# Patient Record
Sex: MALE | Race: WHITE
[De-identification: names, ages, dates, MRNs, and addresses within clinical notes are randomized per-mention and may not be internally consistent; named-entity substitution may affect disease eponyms.]

---

## 2017-09-07 NOTE — XCELERA REPORT
[address] [address]

                             Tel: [phone]

                             Fax: [phone]



                    Lower Extremity Arterial Evaluation

____________________________________________________________________________



Name: MELI BALDWIN

MRN: [id number]                Age: 67 yrs

Gender: Male                   : 1950

Patient Status: Outpatient     Patient Location: 

Account #: [id number]

Study Date: 2017 08:01 AM

Accession #: [id number]

____________________________________________________________________________



Procedure: A color flow and duplex scan of the lower extremity arteries was

performed bilaterally with velocity and waveform anaylsis. Ankle brachial

indicies performed.

Reason For Study: PAIN





Ordering Physician: WILLIAMS, LENNOX

Performed By: Jw Barrett

____________________________________________________________________________



____________________________________________________________________________





Measurements and Calculations



                                   Right         Left

  CFA PSV                          107.6        130.4    cm/sec

  Prox PFA PSV                      65.3        -104.1   cm/sec

  Dist SFA PSV                     -95.5        -111.7   cm/sec

  Dist Pop A PSV                    98.7         95.7    cm/sec

  Dist KAJAL PSV                     116.3        118.6    cm/sec

  Dist PTA PSV                     135.1        188.6    cm/sec

  Iain Pedis PSV                     96.6        -123.4   cm/sec



____________________________________________________________________________



Right Side Arterial Evaluation

Normal velocity and triphasic waveforms noted from the Common Femoral

artery to the infrageniculate vessels.



0 % stenosis noted.



Ankle Brachial index not obtainable due to compressibility.



Left Side Arterial Evaluation

Normal velocity and triphasic waveforms noted from the Common Femoral

artery to the infrageniculate vessels.



0 % stenosis noted.



Ankle Brachial index not obtainable due to compressibility.

____________________________________________________________________________



Interpretation Summary

No hemodynamically significant lesions in the bilateral lower extremities,

on duplex imaging, at rest. Atherosclerotic change suggested by non

compressibility.

____________________________________________________________________________



Electronically signed by:      Lennox Williams      on 2017 12:25 PM



CC: WILLIAMS, LENNOX

>

Williams, Lennox

## 2017-09-27 NOTE — RADIOLOGY REPORT (SQ)
EXAM DESCRIPTION:  FOOT LEFT COMPLETE



COMPLETED DATE/TIME:  9/27/2017 11:04 am



REASON FOR STUDY:  NON-PRS CHRONIC ULCER OTH PRT LEFT FOOT W FAT LAYER EXPOSED L97.522  NON-PRS CHRON
IC ULCER OTH PRT LEFT FOOT W FAT LAYER



COMPARISON:  None.



NUMBER OF VIEWS:  Three views.



TECHNIQUE:  AP, lateral and oblique  radiographic images acquired of the left foot.



LIMITATIONS:  None.



FINDINGS:  MINERALIZATION: Normal.

BONES: No acute fracture or dislocation.  No worrisome bone lesions.  No evidence of osteomyelitis.

JOINTS: There is mild subluxation of the 1st interphalangeal joint.

SOFT TISSUES: The soft tissue ulcer on the base of the 1st digit.  There is no evidence of osteomyeli
tis.  Extensive calcification within Achilles tendon.

OTHER: No other significant finding.



IMPRESSION:  Mild subluxation of the 1st interphalangeal joint.  No evidence of osteomyelitis.  Prior
 Achilles tendinopathy versus prior injury.



TECHNICAL DOCUMENTATION:  JOB ID:  [id number]

 2011 [employer]- All Rights Reserved

## 2018-11-29 NOTE — RADIOLOGY REPORT (SQ)
EXAM DESCRIPTION:  FOOT BILATERAL 3 VIEWS



COMPLETED DATE/TIME:  11/29/2018 11:31 am



REASON FOR STUDY:  NON PRESSURE ULCER RT/LT FOOT WITH FAT LAYER EXPOSED E11.621  TYPE 2 DIABETES PITO
ITUS WITH FOOT ULCER L97.522  NON-PRS CHRONIC ULCER OTH PRT LEFT FOOT W FAT LAYER  L97.512  NON-PRS C
HRONIC ULCER OTH PRT RIGHT FOOT W FAT LAYER



COMPARISON:  None.



NUMBER OF VIEWS:  Three views.



TECHNIQUE:  AP, lateral and oblique  radiographic images acquired of the right and left foot.



LIMITATIONS:  None.



FINDINGS:  MINERALIZATION: Normal.

BONES: No fracture.  Partial amputation of the tip of the left 1st distal phalanx.  Subluxation of th
e left 1st interphalangeal joint.  The proximal and distal phalanges in the left 1st digit have a errol
ewhat heterogeneous appearance.  There is a prominent dorsal calcaneal spur on the left.  No signific
ant osseous abnormality is seen on the right.  There are dorsal and plantar calcaneal spurs on the ri
ght.

JOINTS: No effusions.

SOFT TISSUES: Soft tissue calcifications are seen in the Achilles tendon on the left.

OTHER: No other significant finding.



IMPRESSION:  1.  There are changes in the left 1st digit as described.  Cannot exclude osteomyelitis.


2.  There is a prominent dorsal calcaneal spur on the left.  There is evidence of Achilles tendinopat
hy.

3.  Small dorsal and plantar calcaneal spurs on the right.



TECHNICAL DOCUMENTATION:  JOB ID:  [id number]

 2011 [employer]- All Rights Reserved



Reading location - IP/workstation name: [email or web address]

## 2018-12-06 NOTE — EKG REPORT
SEVERITY:- OTHERWISE NORMAL ECG -

SINUS RHYTHM

BORDERLINE LEFT AXIS DEVIATION

:

Confirmed by: Nic Lugo 06-Dec-2018 22:35:52

## 2018-12-06 NOTE — ER DOCUMENT REPORT
ED General





- General


Chief Complaint: Foot Pain


Stated Complaint: FOOT PAIN


Time Seen by Provider: 12/06/18 12:03


Mode of Arrival: Ambulatory


Information source: Patient


TRAVEL OUTSIDE OF THE [place] IN LAST 30 DAYS: No





- HPI


Notes: 





60-year-old male with a history of type 2 diabetes, hypertension presents to 

the ED for left toe pain with diabetic ulcers and weeping discharge from his 

wound care clinic.  Patient has been seeing wound clinic for diabetic ulcers, 

was told that he may have osteomyelitis and will need IV antibiotics.  Has not 

tried over-the-counter medications.  Patient states his A1c is roughly 9.6, he 

does have an appointment with his primary care provider in the near future.  

Denies any new injury.  States he has not had any sensation in his left foot 

for "years" denies fevers, chills,  chest pain,palpitations,  shortness of 

breath, d speech changes, LH, dizziness, syncope, headaches, wheezing, ST, URI, 

neck pain, numbness or tingling in bilateral upper or lower extremities equally 

aside from his left foot, muscle paralysis, weakness in bilateral upper or 

lower extremities bilaterally. 





- Related Data


Allergies/Adverse Reactions: 


 





No Known Allergies Allergy (Unverified 12/06/18 11:36)


 











Past Medical History





- General


Information source: Patient





- Social History


Smoking Status: Never Smoker


Frequency of alcohol use: None


Drug Abuse: None


Family History: Reviewed & Not Pertinent


Patient has suicidal ideation: No


Patient has homicidal ideation: No





- Past Medical History


Cardiac Medical History: Reports: Hx Hypercholesterolemia, Hx Hypertension


Endocrine Medical History: Reports: Hx Diabetes Mellitus Type 2


Renal/ Medical History: Denies: Hx Peritoneal Dialysis





Review of Systems





- Review of Systems


Constitutional: No symptoms reported


EENT: No symptoms reported


Cardiovascular: No symptoms reported


Respiratory: No symptoms reported


Gastrointestinal: No symptoms reported


Genitourinary: No symptoms reported


Male Genitourinary: No symptoms reported


Musculoskeletal: No symptoms reported


Skin: See HPI


Hematologic/Lymphatic: No symptoms reported


Neurological/Psychological: No symptoms reported





Physical Exam





- Vital signs


Vitals: 


 











Temp Pulse Resp BP Pulse Ox


 


 98.9 F   76   16   128/63 H  97 


 


 12/06/18 11:37  12/06/18 11:37  12/06/18 11:37  12/06/18 11:37  12/06/18 11:37














- Notes


Notes: 





PHYSICAL EXAMINATION:





GENERAL: Well-appearing, well-nourished and in no acute distress.





HEAD: Atraumatic, normocephalic.





EYES: Pupils equal round and reactive to light, extraocular movements intact, 

sclera anicteric, conjunctiva are normal.





ENT: Nares patent, oropharynx clear without exudates.  Moist mucous membranes.





NECK: Normal range of motion, supple without lymphadenopathy





LUNGS: Breath sounds clear to auscultation bilaterally and equal.  No wheezes 

rales or rhonchi.





HEART: Regular rate and rhythm without murmurs





ABDOMEN: Soft, nontender, nondistended abdomen.  No guarding, no rebound.  No 

masses appreciated.





Musculoskeletal: Normal range of motion, no pitting or edema.  No cyanosis.





NEUROLOGICAL: Cranial nerves grossly intact.  Normal speech, normal gait.  

Normal sensory, motor exams 





PSYCH: Normal mood, normal affect.





SKIN: Warm, Dry, normal turgor, no rashes or lesions noted.  Decubitus diabetic 

ulcers to left great toe with purulent drainage noted with surrounding 

erythema.  Distal pulses +2 bilateral lower extremities.  Skin warm, cap refill 

less than 3 seconds.  Ulcer to right great toe approximately 2 cm x 2 cm.





Course





- Re-evaluation


Re-evalutation: 





12/06/18 14:13


-year-old male presents to the ED for evaluation of left great toe decubitus 

ulcer for concerns of osteomyelitis per wound clinic.  CBC shows a white count 

of 11.4 no shift, lactic 1.5, CRP 72.3 x-ray of left foot does show extensive 

soft tissue swelling with subcutaneous emphysema concerning for gas formation 

as well as concerning for osteomyelitis due to new erosive loss of 

mineralization of the distal phalanx, patient has had previous dislocation. 

consulted with Dr. Navi Renteria, surgeon, at 1408  Consulted with Dr. Mary Shearer, hospitalist at 1410 for IV antibiotics and management of colitis 

possible necrotizing fasciitis, surgery consulted and will see patient.  Plan 

of care and agreed with plan of care.  Patient admitted to medical floor under 

hospitalist service.  Vitals remained Stable throughout duration in ER





- Vital Signs


Vital signs: 


 











Temp Pulse Resp BP Pulse Ox


 


 98.9 F   76   16   128/63 H  97 


 


 12/06/18 11:37  12/06/18 11:37  12/06/18 11:37  12/06/18 11:37  12/06/18 11:37














- Laboratory


Result Diagrams: 


 12/06/18 12:38





 12/06/18 12:38


Laboratory results interpreted by me: 


 











  12/06/18 12/06/18





  12:38 12:38


 


WBC  11.4 H 


 


RBC  3.57 L 


 


Hgb  10.5 L 


 


Hct  31.3 L 


 


Sodium   136.3 L


 


Chloride   93 L


 


BUN   35 H


 


Creatinine   1.59 H


 


Est GFR ( Amer)   53 L


 


Est GFR (Non-Af Amer)   44 L


 


Glucose   327 H


 


ALT   19 L


 


C-Reactive Protein   72.3 H














Discharge





- Discharge


Clinical Impression: 


 Toe osteomyelitis, left





Condition: Stable


Disposition: ADMITTED AS INPATIENT


Admitting Provider: Hospitalist - Dr. Mendoza [facility]


Unit Admitted: Medical Floor

## 2018-12-06 NOTE — PDOC CONSULTATION
History of Present Illness


Admission Date/PCP: 


  12/06/18 14:31





  AUSTIN PETERSON MD





Patient complains of: Left great toe infection


History of Present Illness: 


MELI BALDWIN is a 68 year old male with diabetes with several month history 

of left great toe ulcer now presenting with worsening redness and generalized 

malaise over the past several days.  Along with drainage.  Patient denies any 

fever but he has not been feeling well.  Elevated blood sugars.  Foul-smelling 

drainage.  Patient does have sensation in his left foot albeit somewhat 

diminished.  Patient denies any history of myocardial infarction nor 

cerebrovascular accident.  No known peripheral vascular disease.








Past Medical History


Cardiac Medical History: Reports: Hyperlipidema, Hypertension


Endocrine Medical History: Reports: Diabetes Mellitus Type 2





Social History


Smoking Status: Never Smoker





Family History


Parental Family History Reviewed: No


Children Family History Reviewed: No


Sibling(s) Family History Reviewed.: No





Medication/Allergy


Allergies/Adverse Reactions: 


 





No Known Allergies Allergy (Unverified 12/06/18 11:36)


 











Physical Exam


Vital Signs: 


 











Temp Pulse Resp BP Pulse Ox


 


 98.9 F   76   16   128/63 H  97 


 


 12/06/18 11:37  12/06/18 11:37  12/06/18 11:37  12/06/18 11:37  12/06/18 11:37











General appearance: PRESENT: no acute distress, cooperative


Eye exam: PRESENT: conjunctiva pink


Respiratory exam: PRESENT: clear to auscultation roshan


Cardiovascular exam: PRESENT: RRR


GI/Abdominal exam: PRESENT: other - Soft, nondistended, nontender to palpation.


Extremities exam: PRESENT: other - On the right great toe patient has a very 

shallow clean ulcer with no drainage no erythema.  Patient has some skin 

thickening around the great toe however.  He has palpable dorsalis pedis and 

posterior tibial pulses.  On the left side patient has diffuse swelling and 

erythema and crepitus of the right great toe extending cephalad to the 

midportion of his great toe metatarsal.  There is foul-smelling drainage.  

There is an ulcer at the plantar surface of the great toe with drainage at the 

center.  I do not appreciate dorsalis pedis pulse but there is 2+ posterior 

tibial pulse on the left side.


Neurological exam: PRESENT: alert, awake


Psychiatric exam: PRESENT: appropriate affect


Skin exam: PRESENT: warm





Results


Impressions: 


 





Toe X-Ray  12/06/18 12:05


IMPRESSION:  1. Interval reduction of the previously seen dislocation of the 

distal phalanx of the left great toe.


2. There is new erosive loss of mineralization of the distal phalanx, 

concerning for osteomyelitis.


3.  Extensive soft tissue swelling about the left great toe with subcutaneous 

emphysema, findings concerning for gas-forming infection.


 














Assessment & Plan





- Diagnosis


(1) Toe osteomyelitis, left


Is this a current diagnosis for this admission?: Yes   


Plan: 


With subcutaneous air and erythema and foul-smelling drainage.  We will plan 

emergency left great toe amputation and debridement.  Will obviously leave the 

wound open with the degree of infection that is present.  I have discussed with 

the patient the nature of the surgery and the risk and benefits including risk 

of requiring additional surgery, poor wound healing, continued spreading of 

infection, bleeding.  Patient understands and agrees to proceed.

## 2018-12-06 NOTE — PDOC H&P
History of Present Illness


Admission Date/PCP: 


  





  AUSTIN PETERSON MD





Patient complains of: Foot pain


History of Present Illness: 


MELI BALDWIN is a 68 year old male past medical history of diabetes and 

hypertension presented to ED ED complaining of bilateral great toe pain, 

plantar aspect ulcers ulcers, weeping discharge associated with pain with 

ambulation. 





He has had left great toe plantar aspect ulcer about 4 months which has been 

managed as outpatient by his podiatrist.  One week ago he noticed that his left 

great toe ulcer was worsening and is starting to get red and become more 

painful he went to his podiatrist and was discharged with wound care.  He 

noticed that his ulcer was not getting better and today he noticed that it was 

getting redder and starting to produce purulent discharge.  Also has right 

great toe plantar aspect ulcer which has a started about a week ago he denies 

noticing form of trauma that could have caused the ulcer.  





He is denying any fever, shortness of breath, chills, nausea, vomiting, diarrhea

, constipation or any urinary symptoms.  





ED he was found to have severely elevated CRP. 


10/6/2018 left foot x-ray showed a left great toe new erosive loss of 

mineralization concerning for osteomyelitis as well as extensive soft tissue 

swelling around the great left toe with subcutaneous emphysema.  


10/6/2018 right great toe x-ray could not exclude osteomyelitis.





Surgery was consulted by ED physician and they were asked to call medicine team 

for admission and will follow patient.








Past Medical History


Cardiac Medical History: Reports: Hyperlipidema, Hypertension


Endocrine Medical History: Reports: Diabetes Mellitus Type 2





Social History


Smoking Status: Never Smoker





Family History


Parental Family History Reviewed: Yes


Children Family History Reviewed: Yes


Sibling(s) Family History Reviewed.: Yes





Medication/Allergy


Home Medications: 








Doxycycline Hyclate [Vibramycin 100 mg Tablet] 20 mg PO BID 12/06/18 


Fenofibrate Nanocrystallized [Tricor 145 mg Tablet] 145 mg PO DAILY 12/06/18 


Glimepiride [Amaryl 4 mg Tablet] 4 mg PO DAILY 12/06/18 


Meclizine HCl [Antivert 12.5 mg Tablet] 12.5 mg PO BIDP PRN 12/06/18 


Metoprolol Succinate [Toprol  mg Tablet] 100 mg PO DAILY 12/06/18 








Allergies/Adverse Reactions: 


 





No Known Allergies Allergy (Unverified 12/06/18 11:36)


 











Physical Exam


Vital Signs: 


 











Temp Pulse Resp BP Pulse Ox


 


 98.9 F   76   16   128/63 H  97 


 


 12/06/18 11:37  12/06/18 11:37  12/06/18 11:37  12/06/18 11:37  12/06/18 11:37








 Intake & Output











 12/05/18 12/06/18 12/07/18





 06:59 06:59 06:59


 


Weight   105.3 kg











General appearance: PRESENT: no acute distress, mild distress


Head exam: PRESENT: atraumatic, normocephalic


Eye exam: PRESENT: conjunctiva pink, EOMI, PERRLA.  ABSENT: scleral icterus


Ear exam: PRESENT: normal external ear exam


Neck exam: ABSENT: carotid bruit, JVD, lymphadenopathy, thyromegaly


Respiratory exam: PRESENT: clear to auscultation roshan.  ABSENT: rales, rhonchi, 

wheezes


Cardiovascular exam: PRESENT: RRR.  ABSENT: diastolic murmur, rubs, systolic 

murmur


Pulses: PRESENT: normal dorsalis pedis pul


Vascular exam: PRESENT: normal capillary refill


Rectal exam: PRESENT: deferred


Extremities exam: PRESENT: other - Left great toe swollen red erythematous with 

clear weeping discharge.  Tender to palpation.  There is an ulcer at the base 

of the left great toe  Right great toe plantar aspect 2 x 2 centimeter ulcer 

erythematous with no sign of discharge.


Musculoskeletal exam: PRESENT: full ROM, normal inspection


Neurological exam: PRESENT: alert, awake, oriented to person, oriented to place

, oriented to time, oriented to situation, CN II-XII grossly intact.  ABSENT: 

motor sensory deficit


Psychiatric exam: PRESENT: appropriate affect, normal mood.  ABSENT: homicidal 

ideation, suicidal ideation





Results


Laboratory Results: 


 





 12/06/18 12:38 





 12/06/18 12:38 





 











  12/06/18 12/06/18 12/06/18





  12:38 12:38 12:38


 


WBC  11.4 H  


 


RBC  3.57 L  


 


Hgb  10.5 L  


 


Hct  31.3 L  


 


MCV  88  


 


MCH  29.4  


 


MCHC  33.6  


 


RDW  13.0  


 


Plt Count  371  


 


Seg Neutrophils %  72.2  


 


Lymphocytes %  19.5  


 


Monocytes %  7.1  


 


Eosinophils %  0.3  


 


Basophils %  0.9  


 


Absolute Neutrophils  8.2  


 


Absolute Lymphocytes  2.2  


 


Absolute Monocytes  0.8  


 


Absolute Eosinophils  0.0  


 


Absolute Basophils  0.1  


 


Sodium   136.3 L 


 


Potassium   4.4 


 


Chloride   93 L 


 


Carbon Dioxide   27 


 


Anion Gap   16 


 


BUN   35 H 


 


Creatinine   1.59 H 


 


Est GFR ( Amer)   53 L 


 


Est GFR (Non-Af Amer)   44 L 


 


Glucose   327 H 


 


Lactic Acid    1.5


 


Calcium   9.4 


 


Total Bilirubin   0.7 


 


AST   31 


 


ALT   19 L 


 


Alkaline Phosphatase   69 


 


C-Reactive Protein   72.3 H 


 


Total Protein   7.5 


 


Albumin   3.7 











Impressions: 


 





Toe X-Ray  12/06/18 12:05


IMPRESSION:  1. Interval reduction of the previously seen dislocation of the 

distal phalanx of the left great toe.


2. There is new erosive loss of mineralization of the distal phalanx, 

concerning for osteomyelitis.


3.  Extensive soft tissue swelling about the left great toe with subcutaneous 

emphysema, findings concerning for gas-forming infection.


 














Assessment & Plan





- Diagnosis


(1) Toe osteomyelitis, left


Is this a current diagnosis for this admission?: Yes   


Plan: 


Likely source could be from the untreated chronic left great toe ulcer caused 

by uncontrolled underlying diabetes.


X-ray positive for osteomyelitis.  Surgery is planning amputation. 


We will start on Vanco and Zosyn.  Wound and blood culture.  Will de-escalate 

antibiotics based on cultures.





Left lower extremity ultrasound.





Optimize blood sugar. 








(2) Diabetic foot ulcer associated with type 2 diabetes mellitus


Qualifiers: 


   Laterality: right   Non-pressure ulcer stage: limited to breakdown of skin 


Is this a current diagnosis for this admission?: Yes   


Plan: 


Likely due to uncontrolled diabetes.





12/6/2018.  Right great toe x-ray could not exclude osteomyelitis. 





Will order right foot MRI to rule out any early evolving osteomyelitis.





Broad-spectrum antibiotics.  Wound care.





Follow wound culture and blood culture.





Optimize blood glucose.








(3) Hyperlipidemia


Is this a current diagnosis for this admission?: Yes   


Plan: 


No lipid panel available.  Will start on high intensity statins.  


Follow-up lipid panel.


Diet and lifestyle modification.








(4) Hypertension


Is this a current diagnosis for this admission?: Yes   


Plan: 


Normotensive.  Retart home meds.  Monitor vitals adjust meds as needed.








(5) Diabetes


Is this a current diagnosis for this admission?: Yes   


Plan: 


Uncontrolled.


Long-acting insulin, pre-meal insulin, sliding scale insulin, Accu-Chek, 

diabetic diet, diabetic education.  


Will adjust dosage as needed.

## 2018-12-06 NOTE — ER DOCUMENT REPORT
ED Medical Screen (RME)





- General


Chief Complaint: Foot Pain


Stated Complaint: FOOT PAIN


Time Seen by Provider: 12/06/18 12:03


Notes: 





68 years old male with history of diabetes diabetic ulcers, presents today with 

gangrenous left toe as well as toe infection on the right big toe.  He was sent 

over here by his pediatrician.


TRAVEL OUTSIDE OF THE [place] IN LAST 30 DAYS: No





- Related Data


Allergies/Adverse Reactions: 


 





No Known Allergies Allergy (Unverified 12/06/18 11:36)


 











Past Medical History





- Social History


Frequency of alcohol use: None


Drug Abuse: None





- Past Medical History


Cardiac Medical History: Reports: Hx Hypercholesterolemia, Hx Hypertension


Endocrine Medical History: Reports: Hx Diabetes Mellitus Type 2


Renal/ Medical History: Denies: Hx Peritoneal Dialysis





Physical Exam





- Vital signs


Vitals: 





 











Temp Pulse Resp BP Pulse Ox


 


 98.9 F   76   16   128/63 H  97 


 


 12/06/18 11:37  12/06/18 11:37  12/06/18 11:37  12/06/18 11:37  12/06/18 11:37














Course





- Vital Signs


Vital signs: 





 











Temp Pulse Resp BP Pulse Ox


 


 98.9 F   76   16   128/63 H  97 


 


 12/06/18 11:37  12/06/18 11:37  12/06/18 11:37  12/06/18 11:37  12/06/18 11:37














Doctor's Discharge





- Discharge


Referrals: 


ROMINA REICH DPM [Primary Care Provider] - Follow up as needed

## 2018-12-06 NOTE — XCELERA REPORT
[address] [address]

                               Tel: [phone]

                               Fax: [phone]



                      Lower Extremity Arterial Evaluation

_______________________________________________________________________________



Name: MELI BALDWIN

MRN: [id number]                                       Age: 68 yrs

Gender: Male                                          : 1950

Patient Status: Outpatient                            Patient Location: 

Account #: [id number]

Study Date: 2018 03:14 PM

                         Accession #: [id number]

_______________________________________________________________________________

Procedure: A color flow and duplex scan of the lower extremity arteries was

performed bilaterally with velocity and waveform anaylsis.

Reason For Study: ULCER







Ordering Physician: ROMINA REICH

Performed By: Angel Jose

_______________________________________________________________________________

_______________________________________________________________________________





Measurements and Calculations



                                     Right  Left

                     CFA PSV         121.0 135.9 cm/sec

                     Prox PFA PSV    -64.0  73.3 cm/sec

                     Prox Pop A PSV  91.1  110.3 cm/sec

                     Dist KAJAL PSV    102.7 104.3 cm/sec

                     Dist PTA PSV    136.2 217.8 cm/sec

                     Iain Pedis PSV   -48.7 -71.2 cm/sec



_______________________________________________________________________________

Right Side Arterial Evaluation

Normal velocity and triphasic waveforms noted from the Common Femoral artery

to the infrageniculate vessels .



Ankle Brachial index indicates non compressibility.



Left Side Arterial Evaluation

Normal velocity and triphasic waveforms noted from the Common Femoral artery

to the infrageniculate vessels, except for PSV ratio of 2 at the Posterior

Tibial artery, suggesting > 50% stenosis .



Ankle Brachial index indicates non compressibility.



_______________________________________________________________________________

Interpretation Summary

Ankle Brachial indices show non compressibility, indicating arteriosclerosis.

No hemodynamically significant lesions in the right lower extremity only, on

duplex imaging, at rest. Mild hemodynamically significant lesions in the left

lower extremity only, on duplex imaging, at rest.

_______________________________________________________________________________

Electronically signed by:      Lennox Williams      on 2018 05:19 PM



CC: ROMINA REICH

>

Williams, Lennox

## 2018-12-06 NOTE — RADIOLOGY REPORT (SQ)
EXAM DESCRIPTION:  TOE LEFT; TOE RIGHT



COMPLETED DATE/TIME:  12/6/2018 1:09 pm



REASON FOR STUDY:  Toe infection, osteomyelitis



COMPARISON:  11/29/2018



NUMBER OF VIEWS:  Three views.



TECHNIQUE:  AP, lateral, and oblique images acquired of the bilateral great toes.



LIMITATIONS:  None.



FINDINGS:  MINERALIZATION: Normal.

BONES: There has been the interval reduction of the previously seen dislocation of the distal phalanx
 of the left great toe.  There is new erosive loss of mineralization.

JOINTS: No effusions.

SOFT TISSUES: Extensive soft tissue swelling about the left great toe with subcutaneous emphysema.

OTHER: No other significant finding.



IMPRESSION:  1. Interval reduction of the previously seen dislocation of the distal phalanx of the le
ft great toe.

2. There is new erosive loss of mineralization of the distal phalanx, concerning for osteomyelitis.

3.  Extensive soft tissue swelling about the left great toe with subcutaneous emphysema, findings con
cerning for gas-forming infection.



COMMENT:  SITE OF TRAUMA/COMPLAINT MARKED/STAMP COMPLETED: YES.



TECHNICAL DOCUMENTATION:  JOB ID:  [id number]

 2011 [email or web address]- All Rights Reserved



Reading location - IP/workstation name: [email or web address]

## 2018-12-06 NOTE — RADIOLOGY REPORT (SQ)
EXAM DESCRIPTION:  TOE LEFT; TOE RIGHT



COMPLETED DATE/TIME:  12/6/2018 1:09 pm



REASON FOR STUDY:  Toe infection, osteomyelitis



COMPARISON:  11/29/2018



NUMBER OF VIEWS:  Three views.



TECHNIQUE:  AP, lateral, and oblique images acquired of the bilateral great toes.



LIMITATIONS:  None.



FINDINGS:  MINERALIZATION: Normal.

BONES: There has been the interval reduction of the previously seen dislocation of the distal phalanx
 of the left great toe.  There is new erosive loss of mineralization.

JOINTS: No effusions.

SOFT TISSUES: Extensive soft tissue swelling about the left great toe with subcutaneous emphysema.

OTHER: No other significant finding.



IMPRESSION:  1. Interval reduction of the previously seen dislocation of the distal phalanx of the le
ft great toe.

2. There is new erosive loss of mineralization of the distal phalanx, concerning for osteomyelitis.

3.  Extensive soft tissue swelling about the left great toe with subcutaneous emphysema, findings con
cerning for gas-forming infection.



COMMENT:  SITE OF TRAUMA/COMPLAINT MARKED/STAMP COMPLETED: YES.



TECHNICAL DOCUMENTATION:  JOB ID:  [id number]

 2011 [employer]- All Rights Reserved



Reading location - IP/workstation name: [email or web address]

## 2018-12-07 NOTE — PDOC PROGRESS REPORT
Subjective


Progress Note for:: 12/07/18


Subjective:: 





minimal pains


Reason For Visit: 


OSTEOMYELITIS








Physical Exam


Vital Signs: 


 











Temp Pulse Resp BP Pulse Ox


 


 98.6 F   63   16   131/60 H  96 


 


 12/07/18 07:26  12/07/18 07:26  12/07/18 07:26  12/07/18 07:26  12/07/18 07:26








 Intake & Output











 12/06/18 12/07/18 12/08/18





 06:59 06:59 06:59


 


Intake Total  2480 250


 


Output Total  1155 


 


Balance  1325 250


 


Weight  105.3 kg 








great toe amp site looks dry





Results


Laboratory Results: 


 





 12/07/18 06:03 





 12/07/18 06:03 





 











  12/07/18 12/07/18





  06:03 06:03


 


WBC  8.3 


 


RBC  3.38 L 


 


Hgb  10.0 L 


 


Hct  29.3 L 


 


MCV  87 


 


MCH  29.6 


 


MCHC  34.2 


 


RDW  12.9 


 


Plt Count  359 


 


Seg Neutrophils %  57.0 


 


Lymphocytes %  34.8 


 


Monocytes %  6.1 


 


Eosinophils %  1.0 


 


Basophils %  1.1 


 


Absolute Neutrophils  4.7 


 


Absolute Lymphocytes  2.9 


 


Absolute Monocytes  0.5 


 


Absolute Eosinophils  0.1 


 


Absolute Basophils  0.1 


 


Sodium   140.7


 


Potassium   4.0


 


Chloride   97 L


 


Carbon Dioxide   30


 


Anion Gap   14


 


BUN   32 H


 


Creatinine   1.80 H


 


Est GFR ( Amer)   46 L


 


Est GFR (Non-Af Amer)   38 L


 


Glucose   97


 


Calcium   8.8


 


Magnesium   1.8


 


Total Bilirubin   0.5


 


AST   24


 


ALT   17 L


 


Alkaline Phosphatase   53


 


Total Protein   6.4


 


Albumin   3.0 L


 


Triglycerides   229 H


 


Cholesterol   101.88


 


LDL Cholesterol Direct   61


 


VLDL Cholesterol   45.8 H


 


HDL Cholesterol   21 L











Impressions: 


 





Toe X-Ray  12/06/18 12:05


IMPRESSION:  1. Interval reduction of the previously seen dislocation of the 

distal phalanx of the left great toe.


2. There is new erosive loss of mineralization of the distal phalanx, 

concerning for osteomyelitis.


3.  Extensive soft tissue swelling about the left great toe with subcutaneous 

emphysema, findings concerning for gas-forming infection.


 














Assessment & Plan





- Time


Time Spent with patient: 15-24 minutes





- Inpatient Certification


Medical Necessity: Need for IV Antibiotics





- Plan Summary


Plan Summary: 





Start wet to dry dressings today q 12 hrs.


Will tie sutures for delayed closure in 48 hrs.


Continue IV antibiotics

## 2018-12-07 NOTE — RADIOLOGY REPORT (SQ)
EXAM DESCRIPTION:  MRI RT LOWER EXTREMITY WITHOUT



COMPLETED DATE/TIME:  12/7/2018 3:21 pm



REASON FOR STUDY:  Rule out osteomyelitis right great toe pain and swelling right great toe, blister,
 evaluate for osteomyelitis



COMPARISON:  Right great toe films 12/6/2018



TECHNIQUE:  Multiplanar imaging of the right great toe to include fat and fluid sensitive sequences.



LIMITATIONS:  Motion artifact on some of the pulse sequences



FINDINGS:  There is abnormal marrow edema throughout the distal phalanx, right great toe with minimal
 bony cortical loss along the medial and plantar edge of the phalanx.  There is medial skin thickenin
g and subcutaneous edema in the toe.

The interphalangeal joint demonstrates no effusion.  Subcortical edema in the proximal phalanx at the
 interphalangeal joint may represent early involvement with osteomyelitis.

There is osteoarthritis at the 1st metatarsophalangeal joint between the lateral sesamoid bone and un
dersurface of the 1st metatarsal head.

The great toe flexor and extensor tendons are intact.

Mild forefoot subcutaneous edema is present.  No soft tissue abscess



IMPRESSION:  Marrow edema in the distal phalanx right great toe worrisome for osteomyelitis

Medial right great toe cellulitis

Although there is no interphalangeal joint effusion at the great toe, there is some abnormal marrow s
ignal in the proximal phalanx at the interphalangeal joint.  Early involvement with osteomyelitis may
 be present.

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



TECHNICAL DOCUMENTATION:  JOB ID:  [id number]

 2011 [employer]- All Rights Reserved



Reading location - IP/workstation name: [facility]

## 2018-12-08 NOTE — PDOC PROGRESS REPORT
Subjective


Progress Note for:: 12/08/18


Subjective:: 





left foot wound just redressed by nurses and told to be without problems. Will 

change dressings tomorrow. I was also told by hospitalist that he ordered an 

MRI of tf the opposite right foot which showed Osteomyelitis right great toe.


Reason For Visit: 


OSTEOMYELITIS








Physical Exam


Vital Signs: 


 











Temp Pulse Resp BP Pulse Ox


 


 98.5 F   66   16   138/69 H  97 


 


 12/08/18 15:23  12/08/18 15:23  12/08/18 15:23  12/08/18 15:23  12/08/18 15:23








 Intake & Output











 12/07/18 12/08/18 12/09/18





 06:59 06:59 06:59


 


Intake Total 2480 2305 2573


 


Output Total 1155 1750 


 


Balance 7236 082 3031


 


Weight 105.3 kg 103.2 kg 











Exam: 





left foot not obviously swollen





Results


Laboratory Results: 


 





 12/08/18 05:56 





 12/08/18 05:56 





 











  12/08/18 12/08/18





  05:56 05:56


 


WBC  6.8 


 


RBC  3.52 L 


 


Hgb  10.7 L 


 


Hct  31.0 L 


 


MCV  88 


 


MCH  30.3 


 


MCHC  34.4 


 


RDW  12.8 


 


Plt Count  381 


 


Seg Neutrophils %  47.1 


 


Lymphocytes %  42.0 


 


Monocytes %  7.8 


 


Eosinophils %  0.8 


 


Basophils %  2.3 H 


 


Absolute Neutrophils  3.2 


 


Absolute Lymphocytes  2.8 


 


Absolute Monocytes  0.5 


 


Absolute Eosinophils  0.1 


 


Absolute Basophils  0.2 


 


Sodium   141.2


 


Potassium   3.8


 


Chloride   100


 


Carbon Dioxide   26


 


Anion Gap   15


 


BUN   24 H


 


Creatinine   1.60 H


 


Est GFR ( Amer)   52 L


 


Est GFR (Non-Af Amer)   43 L


 


Glucose   98


 


Calcium   9.2


 


Total Bilirubin   0.7


 


AST   26


 


ALT   15 L


 


Alkaline Phosphatase   59


 


Total Protein   7.0


 


Albumin   3.3 L











Impressions: 


 





Toe X-Ray  12/06/18 12:05


IMPRESSION:  1. Interval reduction of the previously seen dislocation of the 

distal phalanx of the left great toe.


2. There is new erosive loss of mineralization of the distal phalanx, 

concerning for osteomyelitis.


3.  Extensive soft tissue swelling about the left great toe with subcutaneous 

emphysema, findings concerning for gas-forming infection.


 








Lower Extremity MRI  12/07/18 00:00


IMPRESSION:  Marrow edema in the distal phalanx right great toe worrisome for 

osteomyelitis


Medial right great toe cellulitis


Although there is no interphalangeal joint effusion at the great toe, there is 

some abnormal marrow signal in the proximal phalanx at the interphalangeal 

joint.  Early involvement with osteomyelitis may be present.


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


 














Assessment & Plan





- Time


Time Spent with patient: 15-24 minutes





- Inpatient Certification


Medical Necessity: Need for IV Antibiotics, Risk of Complication if Not Cared 

For in Hospital





- Plan Summary


Plan Summary: 





Will examine amp site in am and evaluate opposite right great toe for 

osteomyelitis.

## 2018-12-08 NOTE — PDOC PROGRESS REPORT
Subjective


Progress Note for:: 12/08/18


Subjective:: 


12/7/2018.  Day 1 status post left great toe resection due to osteomyelitis.  

Otherwise no acute events overnight patient is feeling much better and denying 

any fever, chills, nausea, vomiting, diarrhea or any constipation.  His pain is 

better than yesterday controlled with narcotics.  His hemoglobin A1c is 12 and 

is stating that he is compliant with his medication.  Plan is to get a right 

foot MRI to rule out any osteomyelitis.  He may go back to the OR on Eduardo day 

for closure of the wound.  Follow-up cultures and continue IV antibiotics.





12/8/2018.  No acute events overnight.  Patient stating that he could not get a 

good night sleep because he was tossing and turning because of anxiety.  He is 

p.o. tolerant and having normal bladder bowel functions.  His bilateral feet 

pain has improved and he is denying having any fever, nausea, vomiting, diarrhea

, constipation or any urinary symptoms.





He is day 2 status post left great toe amputation for osteomyelitis.  He also 

had an MRI of the right foot which showed osteomyelitis of right great toe.  

Surgery still following and will see if they want to do any procedures on the 

right foot otherwise patient will need to be sent home on IV antibiotics based 

on the findings of blood culture.  Terence he is on IV Vanco and Zosyn day #3.





Systolic blood pressure has been in 120s-160, he has been afebrile since 

admission, pulse rate of less than 100, saturating 100% on room air.  CBC CMP 

within normal limits except for chronic anemia and his creatinine is trending 

down from 1.80-1.60, blood glucose has been ranging from .  Fasting blood 

glucose of 98.  Culture from left great toe is growing group B streptococcus 

sensitivity pending,


Reason For Visit: 


OSTEOMYELITIS








Physical Exam


Vital Signs: 


 











Temp Pulse Resp BP Pulse Ox


 


 98.7 F   76   14   160/76 H  97 


 


 12/08/18 07:32  12/08/18 09:34  12/08/18 09:34  12/08/18 07:32  12/08/18 09:34








 Intake & Output











 12/07/18 12/08/18 12/09/18





 06:59 06:59 06:59


 


Intake Total 2480 2305 


 


Output Total 1155 1750 


 


Balance 1325 555 


 


Weight 105.3 kg 103.2 kg 














Results


Laboratory Results: 


 





 12/08/18 05:56 





 12/08/18 05:56 





 











  12/08/18 12/08/18





  05:56 05:56


 


WBC  6.8 


 


RBC  3.52 L 


 


Hgb  10.7 L 


 


Hct  31.0 L 


 


MCV  88 


 


MCH  30.3 


 


MCHC  34.4 


 


RDW  12.8 


 


Plt Count  381 


 


Seg Neutrophils %  47.1 


 


Lymphocytes %  42.0 


 


Monocytes %  7.8 


 


Eosinophils %  0.8 


 


Basophils %  2.3 H 


 


Absolute Neutrophils  3.2 


 


Absolute Lymphocytes  2.8 


 


Absolute Monocytes  0.5 


 


Absolute Eosinophils  0.1 


 


Absolute Basophils  0.2 


 


Sodium   141.2


 


Potassium   3.8


 


Chloride   100


 


Carbon Dioxide   26


 


Anion Gap   15


 


BUN   24 H


 


Creatinine   1.60 H


 


Est GFR ( Amer)   52 L


 


Est GFR (Non-Af Amer)   43 L


 


Glucose   98


 


Calcium   9.2


 


Total Bilirubin   0.7


 


AST   26


 


ALT   15 L


 


Alkaline Phosphatase   59


 


Total Protein   7.0


 


Albumin   3.3 L











Impressions: 


 





Toe X-Ray  12/06/18 12:05


IMPRESSION:  1. Interval reduction of the previously seen dislocation of the 

distal phalanx of the left great toe.


2. There is new erosive loss of mineralization of the distal phalanx, 

concerning for osteomyelitis.


3.  Extensive soft tissue swelling about the left great toe with subcutaneous 

emphysema, findings concerning for gas-forming infection.


 








Lower Extremity MRI  12/07/18 00:00


IMPRESSION:  Marrow edema in the distal phalanx right great toe worrisome for 

osteomyelitis


Medial right great toe cellulitis


Although there is no interphalangeal joint effusion at the great toe, there is 

some abnormal marrow signal in the proximal phalanx at the interphalangeal 

joint.  Early involvement with osteomyelitis may be present.


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


 














Assessment & Plan





- Diagnosis


(1) Toe osteomyelitis, left


Is this a current diagnosis for this admission?: Yes   


Plan: 


Likely source could be from the untreated chronic left great toe ulcer caused 

by uncontrolled underlying diabetes.


X-ray positive for osteomyelitis. 





Day 2 postop for left great toe amputation. 


Preliminary wound culture growing group B beta Streptococcus.


Day 3 of Vanco and Zosyn.  





Continue wound care, follow-up blood and wound culture.  





Optimize blood sugar. 








(2) Toe osteomyelitis, right


Is this a current diagnosis for this admission?: Yes   


Plan: 


Right toe osteomyelitis likely due to diabetic complication.


X-ray on admission negative MRI of right great toe showed possible 

osteomyelitis.


Continue empiric Vanco and Zosyn.


Surgery on board, if no surgical intervention patient will need long-term IV 

antibiotics as an outpatient.


Follow-up cultures.








(3) Hyperlipidemia


Is this a current diagnosis for this admission?: Yes   


Plan: 


ASCVD: 41%.  High intensity statins. 


Diet and lifestyle modification.








(4) Hypertension


Is this a current diagnosis for this admission?: Yes   


Plan: 


Systolic blood pressure 122-160.


 Retart home meds.  Monitor vitals adjust meds as needed.








(5) Diabetes


Is this a current diagnosis for this admission?: Yes   


Plan: 


Controlled.


Long-acting insulin, pre-meal insulin, sliding scale insulin, Accu-Chek, 

diabetic diet, diabetic education.  


Will adjust dosage as needed.

## 2018-12-09 NOTE — PDOC PROGRESS REPORT
Subjective


Progress Note for:: 12/09/18


Subjective:: 





no pains


Reason For Visit: 


OSTEOMYELITIS








Physical Exam


Vital Signs: 


 











Temp Pulse Resp BP Pulse Ox


 


 98.7 F   60   16   129/66 H  98 


 


 12/09/18 15:19  12/09/18 15:19  12/09/18 15:19  12/09/18 15:19  12/09/18 15:19








 Intake & Output











 12/08/18 12/09/18 12/10/18





 06:59 06:59 06:59


 


Intake Total 2305 4709 2320


 


Output Total 1750 955 


 


Balance 555 3754 2320


 


Weight 103.2 kg 103.5 kg 











Exam: 





left great toe amputation looks better.


Right great toe mild inflammation with osteo





Results


Laboratory Results: 


 





 12/09/18 04:03 





 12/09/18 04:03 





 











  12/09/18 12/09/18





  04:03 04:03


 


WBC  6.3 


 


RBC  3.35 L 


 


Hgb  10.0 L 


 


Hct  29.3 L 


 


MCV  88 


 


MCH  29.9 


 


MCHC  34.2 


 


RDW  12.9 


 


Plt Count  402 


 


Seg Neutrophils %  Not Reportable 


 


Lymphocytes %  Not Reportable 


 


Monocytes %  Not Reportable 


 


Eosinophils %  Not Reportable 


 


Basophils %  Not Reportable 


 


Absolute Neutrophils  Not Reportable 


 


Absolute Lymphocytes  Not Reportable 


 


Absolute Monocytes  Not Reportable 


 


Absolute Eosinophils  Not Reportable 


 


Absolute Basophils  Not Reportable 


 


Sodium   140.5


 


Potassium   4.4


 


Chloride   103


 


Carbon Dioxide   25


 


Anion Gap   13


 


BUN   19


 


Creatinine   1.26 H


 


Est GFR ( Amer)   > 60


 


Est GFR (Non-Af Amer)   57 L


 


Glucose   106


 


Calcium   9.1


 


Total Bilirubin   0.5


 


AST   32


 


ALT   18 L


 


Alkaline Phosphatase   56


 


Total Protein   6.5


 


Albumin   3.0 L











Impressions: 


 





Toe X-Ray  12/06/18 12:05


IMPRESSION:  1. Interval reduction of the previously seen dislocation of the 

distal phalanx of the left great toe.


2. There is new erosive loss of mineralization of the distal phalanx, 

concerning for osteomyelitis.


3.  Extensive soft tissue swelling about the left great toe with subcutaneous 

emphysema, findings concerning for gas-forming infection.


 








Lower Extremity MRI  12/07/18 00:00


IMPRESSION:  Marrow edema in the distal phalanx right great toe worrisome for 

osteomyelitis


Medial right great toe cellulitis


Although there is no interphalangeal joint effusion at the great toe, there is 

some abnormal marrow signal in the proximal phalanx at the interphalangeal 

joint.  Early involvement with osteomyelitis may be present.


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


 














Assessment & Plan





- Time


Time Spent with patient: 15-24 minutes





- Inpatient Certification


Medical Necessity: Need for IV Antibiotics, Need for Surgery





- Plan Summary


Plan Summary: 





For amputation of right great toe tomorrow and delayed primary closure left 

great toe amputation

## 2018-12-09 NOTE — PDOC PROGRESS REPORT
Subjective


Progress Note for:: 12/09/18


Subjective:: 


12/7/2018.  Day 1 status post left great toe resection due to osteomyelitis.  

Otherwise no acute events overnight patient is feeling much better and denying 

any fever, chills, nausea, vomiting, diarrhea or any constipation.  His pain is 

better than yesterday controlled with narcotics.  His hemoglobin A1c is 12 and 

is stating that he is compliant with his medication.  Plan is to get a right 

foot MRI to rule out any osteomyelitis.  He may go back to the OR on Eduardo day 

for closure of the wound.  Follow-up cultures and continue IV antibiotics.





12/8/2018.  No acute events overnight.  Patient stating that he could not get a 

good night sleep because he was tossing and turning because of anxiety.  He is 

p.o. tolerant and having normal bladder bowel functions.  His bilateral feet 

pain has improved and he is denying having any fever, nausea, vomiting, diarrhea

, constipation or any urinary symptoms.





He is day 2 status post left great toe amputation for osteomyelitis.  He also 

had an MRI of the right foot which showed osteomyelitis of right great toe.  

Surgery still following and will see if they want to do any procedures on the 

right foot otherwise patient will need to be sent home on IV antibiotics based 

on the findings of blood culture.  Currently he is on IV Vanco and Zosyn day #3.





Systolic blood pressure has been in 120s-160, he has been afebrile since 

admission, pulse rate of less than 100, saturating 100% on room air.  CBC CMP 

within normal limits except for chronic anemia and his creatinine is trending 

down from 1.80-1.60, blood glucose has been ranging from .  Fasting blood 

glucose of 98.  Culture from left great toe is growing group B streptococcus 

sensitivity pending,





12/9/2018.  Day 3 status post left great toe amputation for osteomyelitis.  No 

acute events overnight.  Patient had a good night sleep, ambulatory, p.o. 

tolerant, normal bowel and bladder function, denies any fever, chills, nausea, 

vomiting, diarrhea, constipation or any urinary symptoms.








Reason For Visit: 


OSTEOMYELITIS








Physical Exam


Vital Signs: 


 











Temp Pulse Resp BP Pulse Ox


 


 98.3 F   66   16   144/66 H  95 


 


 12/09/18 11:20  12/09/18 11:20  12/09/18 11:20  12/09/18 11:20  12/09/18 11:20








 Intake & Output











 12/08/18 12/09/18 12/10/18





 06:59 06:59 06:59


 


Intake Total 2305 4709 970


 


Output Total 1750 955 


 


Balance 555 3754 970


 


Weight 103.2 kg 103.5 kg 











General appearance: PRESENT: no acute distress, well-developed, well-nourished


Head exam: PRESENT: atraumatic, normocephalic


Neck exam: ABSENT: carotid bruit, JVD, lymphadenopathy, thyromegaly


Respiratory exam: PRESENT: clear to auscultation roshan.  ABSENT: rales, rhonchi, 

wheezes


Cardiovascular exam: PRESENT: RRR.  ABSENT: diastolic murmur, rubs, systolic 

murmur


Pulses: PRESENT: normal dorsalis pedis pul, +1 pedal pulses bilateral


GI/Abdominal exam: PRESENT: normal bowel sounds, soft.  ABSENT: distended, 

guarding, mass, organolmegaly, rebound, tenderness


Neurological exam: PRESENT: alert, awake, oriented to person, oriented to place

, oriented to time, oriented to situation, CN II-XII grossly intact.  ABSENT: 

motor sensory deficit





Results


Laboratory Results: 


 





 12/09/18 04:03 





 12/09/18 04:03 





 











  12/09/18 12/09/18





  04:03 04:03


 


WBC  6.3 


 


RBC  3.35 L 


 


Hgb  10.0 L 


 


Hct  29.3 L 


 


MCV  88 


 


MCH  29.9 


 


MCHC  34.2 


 


RDW  12.9 


 


Plt Count  402 


 


Seg Neutrophils %  Not Reportable 


 


Lymphocytes %  Not Reportable 


 


Monocytes %  Not Reportable 


 


Eosinophils %  Not Reportable 


 


Basophils %  Not Reportable 


 


Absolute Neutrophils  Not Reportable 


 


Absolute Lymphocytes  Not Reportable 


 


Absolute Monocytes  Not Reportable 


 


Absolute Eosinophils  Not Reportable 


 


Absolute Basophils  Not Reportable 


 


Sodium   140.5


 


Potassium   4.4


 


Chloride   103


 


Carbon Dioxide   25


 


Anion Gap   13


 


BUN   19


 


Creatinine   1.26 H


 


Est GFR ( Amer)   > 60


 


Est GFR (Non-Af Amer)   57 L


 


Glucose   106


 


Calcium   9.1


 


Total Bilirubin   0.5


 


AST   32


 


ALT   18 L


 


Alkaline Phosphatase   56


 


Total Protein   6.5


 


Albumin   3.0 L











Impressions: 


 





Toe X-Ray  12/06/18 12:05


IMPRESSION:  1. Interval reduction of the previously seen dislocation of the 

distal phalanx of the left great toe.


2. There is new erosive loss of mineralization of the distal phalanx, 

concerning for osteomyelitis.


3.  Extensive soft tissue swelling about the left great toe with subcutaneous 

emphysema, findings concerning for gas-forming infection.


 








Lower Extremity MRI  12/07/18 00:00


IMPRESSION:  Marrow edema in the distal phalanx right great toe worrisome for 

osteomyelitis


Medial right great toe cellulitis


Although there is no interphalangeal joint effusion at the great toe, there is 

some abnormal marrow signal in the proximal phalanx at the interphalangeal 

joint.  Early involvement with osteomyelitis may be present.


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


 














Assessment & Plan





- Diagnosis


(1) Toe osteomyelitis, left


Is this a current diagnosis for this admission?: Yes   


Plan: 


Likely source could be from the untreated chronic left great toe ulcer caused 

by uncontrolled underlying diabetes.


X-ray positive for osteomyelitis. 





Day 3 postop for left great toe amputation. 


Preliminary wound culture growing group B beta Streptococcus.


Day 4 of Vanco and Zosyn.  


Blood cultures negative x48 and 72 hours.


Wound culture from left foot positive for gram-positive Streptococcus, gram-

positive cocci in clusters pending sensitivity.


Continue wound care, follow-up blood and wound culture. 


Optimize blood sugar. 








(2) Toe osteomyelitis, right


Is this a current diagnosis for this admission?: Yes   


Plan: 


Right toe osteomyelitis likely due to diabetic complication.


X-ray on admission negative MRI of right great toe showed possible 

osteomyelitis.


Continue empiric Vanco and Zosyn.


Surgery on board, if no surgical intervention patient will need long-term IV 

antibiotics as an outpatient.


Follow-up cultures.








(3) Hyperlipidemia


Is this a current diagnosis for this admission?: Yes   


Plan: 


ASCVD: 41%.  High intensity statins. 


Diet and lifestyle modification.








(4) Hypertension


Is this a current diagnosis for this admission?: Yes   


Plan: 


Systolic blood pressure 128-160, temperature 98.3, pulse 66-83, SPO2 95% on 

room air





White blood cells 6.3, hemoglobin 10.0, platelet 402, sodium 140.5, potassium 

4.4





Continue Coreg, start on low-dose lisinopril. Monitor vitals adjust meds as 

needed.








(5) Diabetes


Is this a current diagnosis for this admission?: Yes   


Plan: 


Controlled.


Long-acting insulin, pre-meal insulin, sliding scale insulin, Accu-Chek, 

diabetic diet, diabetic education.  


Fasting blood glucose 106,  - 240


Will adjust dosage as needed.








(6) SOFIA (acute kidney injury)


Is this a current diagnosis for this admission?: Yes   


Plan: 


Acute on chronic CKD.  Baseline 1.26.  Worsening likely due to vancomycin.  





Improving





Sodium 140, potassium 4.4, chloride 103, BUN 19, creatinine 1.26 from 1.5.

## 2018-12-10 NOTE — PDOC PROGRESS REPORT
Subjective


Progress Note for:: 12/10/18


Subjective:: 


12/7/2018.  Day 1 status post left great toe resection due to osteomyelitis.  

Otherwise no acute events overnight patient is feeling much better and denying 

any fever, chills, nausea, vomiting, diarrhea or any constipation.  His pain is 

better than yesterday controlled with narcotics.  His hemoglobin A1c is 12 and 

is stating that he is compliant with his medication.  Plan is to get a right 

foot MRI to rule out any osteomyelitis.  He may go back to the OR on Eduardo day 

for closure of the wound.  Follow-up cultures and continue IV antibiotics.





12/8/2018.  No acute events overnight.  Patient stating that he could not get a 

good night sleep because he was tossing and turning because of anxiety.  He is 

p.o. tolerant and having normal bladder bowel functions.  His bilateral feet 

pain has improved and he is denying having any fever, nausea, vomiting, diarrhea

, constipation or any urinary symptoms.





He is day 2 status post left great toe amputation for osteomyelitis.  He also 

had an MRI of the right foot which showed osteomyelitis of right great toe.  

Surgery still following and will see if they want to do any procedures on the 

right foot otherwise patient will need to be sent home on IV antibiotics based 

on the findings of blood culture.  Currently he is on IV Vanco and Zosyn day #3.





Systolic blood pressure has been in 120s-160, he has been afebrile since 

admission, pulse rate of less than 100, saturating 100% on room air.  CBC CMP 

within normal limits except for chronic anemia and his creatinine is trending 

down from 1.80-1.60, blood glucose has been ranging from .  Fasting blood 

glucose of 98.  Culture from left great toe is growing group B streptococcus 

sensitivity pending,





12/9/2018.  Day 3 status post left great toe amputation for osteomyelitis.  No 

acute events overnight.  Patient had a good night sleep, ambulatory, p.o. 

tolerant, normal bowel and bladder function, denies any fever, chills, nausea, 

vomiting, diarrhea, constipation or any urinary symptoms.





12/15/2018.  No acute events overnight.  Patient sitting comfortably in his bed 

waiting for his surgery planned for today.  He is stating he had a good night 

sleep except for frequent interruption for blood draws.  He denies any fever, 

nausea, vomiting, diarrhea, chills, shortness of breath, constipation or any 

urinary symptoms.





Vitals -160, temperature 98.6, pulse 66-75 SPO2 98% on RA





Labs: WBC 5.9, Hgb 9.7, platelet 389, sodium 140, potassium 4.6, chloride 103, 

BUN 16, creatinine 1.21, fasting blood glucose 230





Cultures: Left toe wound culture positive for group B beta strep, gram-positive 

cocci in chains and clusters, pending sensitivity.





 


Reason For Visit: 


OSTEOMYELITIS








Physical Exam


Vital Signs: 


 











Temp Pulse Resp BP Pulse Ox


 


 98.6 F   61   16   135/69 H  98 


 


 12/10/18 07:14  12/10/18 07:14  12/10/18 07:14  12/10/18 07:14  12/10/18 07:14








 Intake & Output











 12/09/18 12/10/18 12/11/18





 06:59 06:59 06:59


 


Intake Total 4709 2904 250


 


Output Total 955 300 


 


Balance 3754 2604 250


 


Weight 103.5 kg 108.4 kg 











General appearance: PRESENT: no acute distress, well-developed, well-nourished


Head exam: PRESENT: atraumatic, normocephalic


Respiratory exam: PRESENT: clear to auscultation roshan.  ABSENT: rales, rhonchi, 

wheezes


Pulses: PRESENT: normal dorsalis pedis pul


Extremities exam: PRESENT: full ROM.  ABSENT: calf tenderness, clubbing, pedal 

edema


Musculoskeletal exam: PRESENT: other - Left foot status post great toe 

amputation.  Wound looks clean no sign of infection. Right great toe plantar 

aspect ulcer base looks clean no sign of infection on discharge.


Neurological exam: PRESENT: alert, awake, oriented to person, oriented to place

, oriented to time, oriented to situation, CN II-XII grossly intact.  ABSENT: 

motor sensory deficit





Results


Laboratory Results: 


 





 12/10/18 05:50 





 12/10/18 05:50 





 











  12/10/18 12/10/18 12/10/18





  05:50 05:50 05:50


 


WBC   5.9 


 


RBC   3.27 L 


 


Hgb   9.7 L 


 


Hct   28.5 L 


 


MCV   87 


 


MCH   29.7 


 


MCHC   34.1 


 


RDW   12.7 


 


Plt Count   389 


 


Seg Neutrophils %   50.6 


 


Lymphocytes %   39.8 


 


Monocytes %   6.9 


 


Eosinophils %   0.9 


 


Basophils %   1.8 


 


Absolute Neutrophils   3.0 


 


Absolute Lymphocytes   2.3 


 


Absolute Monocytes   0.4 


 


Absolute Eosinophils   0.1 


 


Absolute Basophils   0.1 


 


Sodium    140.8


 


Potassium    4.6


 


Chloride    103


 


Carbon Dioxide    26


 


Anion Gap    12


 


BUN    16


 


Creatinine  Cancelled   1.21


 


Est GFR ( Amer)  Cancelled   > 60


 


Est GFR (Non-Af Amer)  Cancelled   > 60


 


Glucose    230 H


 


Calcium    8.9


 


Magnesium  Cancelled   1.7


 


Total Bilirubin    0.3


 


AST    47


 


ALT    13 L


 


Alkaline Phosphatase    54


 


Total Protein    6.8


 


Albumin    3.1 L








 





12/06/18 17:22   Foot - Abscess   Gram Stain - Final








Impressions: 


 





Toe X-Ray  12/06/18 12:05


IMPRESSION:  1. Interval reduction of the previously seen dislocation of the 

distal phalanx of the left great toe.


2. There is new erosive loss of mineralization of the distal phalanx, 

concerning for osteomyelitis.


3.  Extensive soft tissue swelling about the left great toe with subcutaneous 

emphysema, findings concerning for gas-forming infection.


 








Lower Extremity MRI  12/07/18 00:00


IMPRESSION:  Marrow edema in the distal phalanx right great toe worrisome for 

osteomyelitis


Medial right great toe cellulitis


Although there is no interphalangeal joint effusion at the great toe, there is 

some abnormal marrow signal in the proximal phalanx at the interphalangeal 

joint.  Early involvement with osteomyelitis may be present.


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


 














Assessment & Plan





- Diagnosis


(1) Toe osteomyelitis, left


Is this a current diagnosis for this admission?: Yes   


Plan: 


Likely source could be from the untreated chronic left great toe ulcer caused 

by uncontrolled underlying diabetes.


X-ray positive for osteomyelitis. 





Day 4 postop for left great toe amputation. 





Preliminary wound culture growing group B beta Streptococcus.


Day 5 of Vanco and Zosyn.  





Blood cultures negative since admission.  





Vitals -160, temperature 98.6, pulse 66-75 SPO2 98% on RA





Labs: WBC 5.9, Hgb 9.7, platelet 389, sodium 140, potassium 4.6, chloride 103, 

BUN 16, creatinine 1.21, fasting blood glucose 230





Cultures: Left toe wound culture positive for group B beta strep, gram-positive 

cocci in chains and clusters, pending sensitivity.


.


Continue wound care, follow-up blood and wound culture. 








(2) Toe osteomyelitis, right


Is this a current diagnosis for this admission?: Yes   


Plan: 


Right toe osteomyelitis likely due to diabetic complication.


X-ray on admission negative MRI of right great toe showed possible 

osteomyelitis.





Day 5 of Vanco and Zosyn.





Plan for right great toe amputation today.





Vitals -160, temperature 98.6, pulse 66-75 SPO2 98% on RA





Labs: WBC 5.9, Hgb 9.7, platelet 389, sodium 140, potassium 4.6, chloride 103, 

BUN 16, creatinine 1.21, fasting blood glucose 230





Cultures: Left toe wound culture positive for group B beta strep, gram-positive 

cocci in chains and clusters, pending sensitivity.








(3) Hyperlipidemia


Is this a current diagnosis for this admission?: Yes   


Plan: 


ASCVD: 41%.  High intensity statins. 


Diet and lifestyle modification.








(4) Hypertension


Is this a current diagnosis for this admission?: Yes   


Plan: 


Not controlled.  





Vitals -160, temperature 98.6, pulse 66-75 SPO2 98% on RA





Labs: WBC 5.9, Hgb 9.7, platelet 389, sodium 140, potassium 4.6, chloride 103, 

BUN 16, creatinine 1.21, fasting blood glucose 230





Continue Coreg and lisinopril.  Adjust meds as needed.








(5) Diabetes


Is this a current diagnosis for this admission?: Yes   


Plan: 


Not controlled.  


Labs: WBC 5.9, Hgb 9.7, platelet 389, sodium 140, potassium 4.6, chloride 103, 

BUN 16, creatinine 1.21, fasting blood glucose 230





Long-acting insulin, pre-meal insulin, sliding scale insulin, Accu-Chek, 

diabetic diet, diabetic education.  





Increase Lantus and pre-meal by 2 units.














(6) SOFIA (acute kidney injury)


Is this a current diagnosis for this admission?: Yes   


Plan: 


Acute on chronic CKD.  Baseline 1.26.  Likely due to vancomycin and lisinopril.





Improving not back to baseline.


Labs: sodium 140, potassium 4.6, chloride 103, BUN 16, creatinine 1.21

## 2018-12-10 NOTE — OPERATIVE REPORT
Nonrecallable Operative Report


DATE OF SURGERY: 12/10/18


PREOPERATIVE DIAGNOSIS: 1.  Open wound left foot status post left great toe 

amputation;.  2.  Osteomyelitis of the right great toe


POSTOPERATIVE DIAGNOSIS: Same with left foot wound open and unsuitable for 

primary closure


OPERATION: 1.  Right great toe amputation with primary closure over Penrose 

drain fragment.  2.  Excisional debridement of skin, subcutaneous tissue, fascia

, and partial left great first toe metatarsalectomy , and blood vessels at site 

of left great toe ray amputation


SURGEON: HALLIE PATRICIO


ANESTHESIA: Other - Via LMA


TISSUE REMOVED OR ALTERED: Right great toe; fragments of left foot wound 

including bone


COMPLICATIONS: 





None


ESTIMATED BLOOD LOSS: 25 cc


INTRAOPERATIVE FINDINGS: See below


PROCEDURE: 





Patient was taken the main operating room where general anesthesia was induced 

via LMA.  Both feet were exposed, dressings and sutures removed from the left 

foot wound, and both feet prepped and draped in sterile fashion





Surgical plan and surgical timeout were conducted.





We directed our attention to the right foot, specifically the right great toe.  

The toe was amputated at the space using a #10 blade.  The periosteum was 

exposed to the proximal phalanx.  The phalanx was formally amputated with the 

bone cutter.





We now amputated the distal right first metatarsal head primarily using bone 

rongeurs.  This was rounded out nicely.  The medial digital artery was oversewn 

with 3-0 Vicryl suture.  A small fragment Penrose drain was placed in the 

medial aspect of the wound, and the wound closed primarily with 5 interrupted 

vertical mattress 3-0 Ethilon sutures.





Left foot now addressed.  The wound was significant for markedly viable 

subcutaneous tissue fascia etc. so this was all debrided using rongeurs.  The 

first metatarsal head had already been transected.  We took the metatarsal bone 

back several centimeters, getting to more viable, bleeding bone.  The medial 

digital artery was oversewn with a 3-0 Vicryl suture wound irrigated.





Options for closure were considered.  To the surgeon, the wound was contaminated

, with a lot of nonviable tissue but no kwaku pus.  I felt that a delayed 

closure would be most appropriate likely installing a wound VAC in 24-48 hours.





Therefore the left right ray amputation site was packed with iodoform packing.  

Both feet were dressed with Xeroform 4 x 4 between the toes and Kerlix 

dressings.





Patient tolerated the procedure well, extubated, and taken recovery in stable 

condition.

## 2018-12-10 NOTE — PDOC PROGRESS REPORT
Subjective


Progress Note for:: 12/07/18


Subjective:: 


12/7/2018.  Day 1 status post left great toe resection due to osteomyelitis.  

Otherwise no acute events overnight patient is feeling much better and denying 

any fever, chills, nausea, vomiting, diarrhea or any constipation.  His pain is 

better than yesterday controlled with narcotics.  His hemoglobin A1c is 12 and 

is stating that he is compliant with his medication.  Plan is to get a right 

foot MRI to rule out any osteomyelitis.  He may go back to the OR on Eduardo day 

for closure of the wound.  Follow-up cultures and continue IV antibiotics.


Reason For Visit: 


OSTEOMYELITIS








Physical Exam


Vital Signs: 


 











Temp Pulse Resp BP Pulse Ox


 


 98.6 F   63   16   131/60 H  96 


 


 12/07/18 07:26  12/07/18 07:26  12/07/18 07:26  12/07/18 07:26  12/07/18 07:26








 Intake & Output











 12/06/18 12/07/18 12/08/18





 06:59 06:59 06:59


 


Intake Total  2480 250


 


Output Total  1155 


 


Balance  1325 250


 


Weight  105.3 kg 











General appearance: PRESENT: no acute distress, obese, well-developed, well-

nourished


Respiratory exam: PRESENT: clear to auscultation roshan.  ABSENT: rales, rhonchi, 

wheezes


Cardiovascular exam: PRESENT: RRR.  ABSENT: diastolic murmur, rubs, systolic 

murmur


GI/Abdominal exam: PRESENT: normal bowel sounds, soft.  ABSENT: distended, 

guarding, mass, organolmegaly, rebound, tenderness


Extremities exam: PRESENT: full ROM, other - Left great toe status post 

amputation.  Surgical wound looks clean no active discharge. Right great toe 

skin ulcer clean with clean dressing no active discharge..  ABSENT: calf 

tenderness, clubbing, pedal edema


Neurological exam: PRESENT: alert, awake, oriented to person, oriented to place

, oriented to time, oriented to situation, CN II-XII grossly intact.  ABSENT: 

motor sensory deficit





Results


Laboratory Results: 


 





 12/07/18 06:03 





 12/07/18 06:03 





 











  12/07/18 12/07/18





  06:03 06:03


 


WBC  8.3 


 


RBC  3.38 L 


 


Hgb  10.0 L 


 


Hct  29.3 L 


 


MCV  87 


 


MCH  29.6 


 


MCHC  34.2 


 


RDW  12.9 


 


Plt Count  359 


 


Seg Neutrophils %  57.0 


 


Lymphocytes %  34.8 


 


Monocytes %  6.1 


 


Eosinophils %  1.0 


 


Basophils %  1.1 


 


Absolute Neutrophils  4.7 


 


Absolute Lymphocytes  2.9 


 


Absolute Monocytes  0.5 


 


Absolute Eosinophils  0.1 


 


Absolute Basophils  0.1 


 


Sodium   140.7


 


Potassium   4.0


 


Chloride   97 L


 


Carbon Dioxide   30


 


Anion Gap   14


 


BUN   32 H


 


Creatinine   1.80 H


 


Est GFR ( Amer)   46 L


 


Est GFR (Non-Af Amer)   38 L


 


Glucose   97


 


Calcium   8.8


 


Magnesium   1.8


 


Total Bilirubin   0.5


 


AST   24


 


ALT   17 L


 


Alkaline Phosphatase   53


 


Total Protein   6.4


 


Albumin   3.0 L


 


Triglycerides   229 H


 


Cholesterol   101.88


 


LDL Cholesterol Direct   61


 


VLDL Cholesterol   45.8 H


 


HDL Cholesterol   21 L











Impressions: 


 





Toe X-Ray  12/06/18 12:05


IMPRESSION:  1. Interval reduction of the previously seen dislocation of the 

distal phalanx of the left great toe.


2. There is new erosive loss of mineralization of the distal phalanx, 

concerning for osteomyelitis.


3.  Extensive soft tissue swelling about the left great toe with subcutaneous 

emphysema, findings concerning for gas-forming infection.


 














Assessment & Plan





- Diagnosis


(1) Toe osteomyelitis, left


Is this a current diagnosis for this admission?: Yes   


Plan: 


Likely source could be from the untreated chronic left great toe ulcer caused 

by uncontrolled underlying diabetes.


X-ray positive for osteomyelitis. 





Day 1 postop for left great toe amputation. 


Preliminary wound culture growing group B beta Streptococcus.


Day 2 of Vanco and Zosyn.  





Continue wound care, follow-up blood and wound culture.  





Optimize blood sugar. 








(2) Diabetic foot ulcer associated with type 2 diabetes mellitus


Qualifiers: 


   Laterality: right   Non-pressure ulcer stage: limited to breakdown of skin 


Is this a current diagnosis for this admission?: Yes   


Plan: 


Likely due to uncontrolled diabetes.





12/6/2018.  Right great toe x-ray could not exclude osteomyelitis. 





7/2018 MRI left foot marrow edema in the distal phalanx right great toe 

worrisome for osteomyelitis.





Day 2 of Vanco and Zosyn.





Surgery on board.





Follow-up wound and blood cultures.  Prelim wound culture from left great toe 

grows group B streptococcus.  Pending sensitivity.








(3) Hyperlipidemia


Is this a current diagnosis for this admission?: Yes   


Plan: 


ASCVD: 41%.  High intensity statins. 


Diet and lifestyle modification.








(4) Hypertension


Is this a current diagnosis for this admission?: Yes   


Plan: 


Normotensive.  Retart home meds.  Monitor vitals adjust meds as needed.








(5) Diabetes


Is this a current diagnosis for this admission?: Yes   


Plan: 


Uncontrolled.


Long-acting insulin, pre-meal insulin, sliding scale insulin, Accu-Chek, 

diabetic diet, diabetic education.  


Will adjust dosage as needed.

## 2018-12-11 NOTE — PDOC PROGRESS REPORT
Subjective


Progress Note for:: 12/11/18


Subjective:: 





feels good post amp right great toe and revision of amputation left great toe


Reason For Visit: 


OSTEOMYELITIS








Physical Exam


Vital Signs: 


 











Temp Pulse Resp BP Pulse Ox


 


 98.4 F   59 L  16   140/60 H  96 


 


 12/11/18 07:14  12/11/18 07:14  12/11/18 07:14  12/11/18 07:14  12/11/18 07:14








 Intake & Output











 12/10/18 12/11/18 12/12/18





 06:59 06:59 06:59


 


Intake Total 2904 6166 350


 


Output Total 300 2660 


 


Balance 2604 3506 350


 


Weight 108.4 kg 111.6 kg 











Exam: 





Right great toe dressing dry


 Left great toe amp dressing removed and will need wound vac. Ordered





Results


Laboratory Results: 


 





 12/11/18 04:28 





 12/11/18 04:28 





 











  12/11/18 12/11/18





  04:28 04:28


 


WBC  10.5 


 


RBC  3.19 L 


 


Hgb  9.8 L 


 


Hct  28.3 L 


 


MCV  89 


 


MCH  30.6 


 


MCHC  34.5 


 


RDW  12.8 


 


Plt Count  383 


 


Seg Neutrophils %  73.1 


 


Lymphocytes %  19.9 


 


Monocytes %  6.2 


 


Eosinophils %  0.1 


 


Basophils %  0.7 


 


Absolute Neutrophils  7.7 


 


Absolute Lymphocytes  2.1 


 


Absolute Monocytes  0.7 


 


Absolute Eosinophils  0.0 


 


Absolute Basophils  0.1 


 


Sodium   138.4


 


Potassium   4.8


 


Chloride   103


 


Carbon Dioxide   22


 


Anion Gap   13


 


BUN   16


 


Creatinine   1.22


 


Est GFR ( Amer)   > 60


 


Est GFR (Non-Af Amer)   59 L


 


Glucose   172 H


 


Calcium   9.1


 


Total Bilirubin   0.3


 


AST   27


 


ALT   21


 


Alkaline Phosphatase   57


 


Total Protein   6.4


 


Albumin   3.0 L








 





12/06/18 17:22   Foot - Abscess   Gram Stain - Final


12/06/18 17:22   Foot - Abscess   Wound Culture - Final


                            Staphylococcus Aureus


                            Enterococcus Faecalis(Group D)


                            Group B Beta Streptococcus


                            No Anaerobic Organisms








Impressions: 


 





Toe X-Ray  12/06/18 12:05


IMPRESSION:  1. Interval reduction of the previously seen dislocation of the 

distal phalanx of the left great toe.


2. There is new erosive loss of mineralization of the distal phalanx, 

concerning for osteomyelitis.


3.  Extensive soft tissue swelling about the left great toe with subcutaneous 

emphysema, findings concerning for gas-forming infection.


 








Lower Extremity MRI  12/07/18 00:00


IMPRESSION:  Marrow edema in the distal phalanx right great toe worrisome for 

osteomyelitis


Medial right great toe cellulitis


Although there is no interphalangeal joint effusion at the great toe, there is 

some abnormal marrow signal in the proximal phalanx at the interphalangeal 

joint.  Early involvement with osteomyelitis may be present.


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


 














Assessment & Plan





- Time


Time Spent with patient: 15-24 minutes





- Inpatient Certification


Medical Necessity: Need for IV Antibiotics, Risk of Complication if Not Cared 

For in Hospital





- Plan Summary


Plan Summary: 





Wound vac to left big toe amp site


Consult discharge planning nurse

## 2018-12-11 NOTE — PROGRESS NOTE
Provider Note


Provider Note: 


ID Consult Note





Asked to review patient's chart by Pharmacy. Pt not seen or examined. Reviewed 

VS, imaging reports, provider reports, operative note, relevant labs.  Mr. Osei is a 68 year old man with PMH including obesity, poorly controlled DM 

and several month history of L toe ulcer who presented to the ED on 12/6/18 

with c/o malaise x several days and worsening redness to L 1st toe. Pt was 

afebrile but noted to have diabetic ulcers involving L 1st toe with crepitus, 

malodorous purulent drainage and surrounding erythema and also an ulcer to R 

toe that with mild inflammation on exam. Plain films showed erosive loss of 

mineralization involving the distal phalanx of the L 1st toe and soft tissue 

swelling with subcutaneous emphysema. Pt underwent amputation of the L 1st toe 

on 12/6/18. MRI of the R foot on 12/7/18 was read as showing marrow edema in 

the R 1st toe worrisome for osteomyelitis involving the distal phalanx and 

question of early osteomyelitis about the interphalangeal joint. The pathology 

examination of the submitted specimen included acute osteomyelitis with 

osteonecrosis of the toe but from the area of resection no acute osteomyelitis 

was present. The decision was made to amputate the R 1st toe at the same time 

of delayed primary closure for the L 1st toe amputation. On 12/10/18 the 

patient returned to the OR, at which time the R 1st toe was amputated and the 

the distal metatarsal head was resected; the left foot metatarsal was taken 

back several centimeters. With contamination of the wound and nonviable tissue 

but no kwaku pus appreciated interoperatively, the decision was made to use 

wound VAC before closing.  





In terms of cultures, pt has had negative BCx. A wound culture from 12/6 grew 

MSSA, Group B Strep and Enterococcus faecalis. Pt has been empirically 

receiving vancomycin and Zosyn.





Impression


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


- s/p amputation of 1st R and L toes with resection down to healthy bleeding 

hard bone and no osteomyelitis at resection on L metatarsal remnant; pathology 

report for R toe pending but unlikely to have residual osteomyelitis based on 

level of resection in relation to radiographic abnormalities


- Zosyn is slightly broader than strictly needed (no anaerobes isolated, no 

Pseudomonas), but has antimicrobial activity against all isolated pathogens in 

the culture from 12/6 and knowing how to dose adjust Zosyn for obesity also 

favors its use.





Recommendations


- No MRSA isolated; stop vancomycin


- Duration of Zosyn should be short, considering all osteomyelitic bone appears 

to have been removed. How long to continue Zosyn should be guided by any 

whether or not there is residual soft tissue signs of infection or 

inflammation. Pt has received Zosyn from 12/6 to present (today is day 6).  


- If the soft tissue looks clean and uninfected, stop Zosyn presently. If there 

is some remaining erythema, Zosyn can be continued for another few days before 

stopping. 





Gilles Torres MD


[facility]


pager [phone]

## 2018-12-11 NOTE — PDOC PROGRESS REPORT
Subjective


Progress Note for:: 12/11/18


Subjective:: 





The patient is a 68-year-old male with a past medical history of uncontrolled 

diabetes, hypertension, hyperlipidemia, obesity who was admitted 12/6/2018 with 

osteomyelitis of the left great toe and a chronic diabetic foot ulcer to the 

right.





Patient was seen on morning rounds.  He was found resting in bed comfortably on 

room air.  He is now status post amputations of bilateral great toes.  He tells 

me that his pain is fairly well controlled, however, is experiencing a burning 

sensation to the plantar surface of both feet that occasionally extends midway 

up his legs.  He denies any neuropathy to his fingers or hands.  He has been 

ambulatory with a front wheel walker and is looking forward to she is 

discharged home within the next few days.


We discussed setting up home health services to assist with wound care, and to 

continue education regarding his diabetes management; he seems relieved to know 

that there will be continued nursing assistance following discharge.


He has no other questions or concerns at this time.


No concerns per nursing.


Reason For Visit: 


OSTEOMYELITIS








Physical Exam


Vital Signs: 


 











Temp Pulse Resp BP Pulse Ox


 


 98.9 F   62   16   124/59 L  97 


 


 12/11/18 15:26  12/11/18 15:26  12/11/18 15:26  12/11/18 15:26  12/11/18 15:26








 Intake & Output











 12/10/18 12/11/18 12/12/18





 06:59 06:59 06:59


 


Intake Total 2904 6166 2118


 


Output Total 300 2660 900


 


Balance 2604 3506 1218


 


Weight 108.4 kg 111.6 kg 











General appearance: PRESENT: no acute distress, cooperative, obese, well-

developed, well-nourished


Head exam: PRESENT: atraumatic, normocephalic


Eye exam: PRESENT: conjunctiva pink, EOMI, PERRLA.  ABSENT: scleral icterus


Ear exam: PRESENT: normal external ear exam


Mouth exam: PRESENT: moist, tongue midline


Neck exam: ABSENT: carotid bruit, JVD, lymphadenopathy, thyromegaly


Respiratory exam: PRESENT: clear to auscultation roshan, symmetrical, unlabored.  

ABSENT: rales, rhonchi, wheezes


Cardiovascular exam: PRESENT: RRR.  ABSENT: diastolic murmur, rubs, systolic 

murmur


Pulses: PRESENT: normal dorsalis pedis pul


Vascular exam: PRESENT: normal capillary refill


GI/Abdominal exam: PRESENT: normal bowel sounds, soft.  ABSENT: distended, 

guarding, mass, organolmegaly, rebound, tenderness


Rectal exam: PRESENT: deferred


Extremities exam: PRESENT: full ROM, other - bilateral great toe amputations; 

wounds not visualized, surgical dressings in place..  ABSENT: calf tenderness, 

clubbing, pedal edema


Neurological exam: PRESENT: alert, awake, oriented to person, oriented to place

, oriented to time, oriented to situation, CN II-XII grossly intact.  ABSENT: 

motor sensory deficit


Psychiatric exam: PRESENT: appropriate affect, normal mood.  ABSENT: homicidal 

ideation, suicidal ideation


Skin exam: PRESENT: dry, warm.  ABSENT: cyanosis, rash





Results


Laboratory Results: 


 





 12/11/18 04:28 





 12/11/18 04:28 





 











  12/11/18 12/11/18





  04:28 04:28


 


WBC  10.5 


 


RBC  3.19 L 


 


Hgb  9.8 L 


 


Hct  28.3 L 


 


MCV  89 


 


MCH  30.6 


 


MCHC  34.5 


 


RDW  12.8 


 


Plt Count  383 


 


Seg Neutrophils %  73.1 


 


Lymphocytes %  19.9 


 


Monocytes %  6.2 


 


Eosinophils %  0.1 


 


Basophils %  0.7 


 


Absolute Neutrophils  7.7 


 


Absolute Lymphocytes  2.1 


 


Absolute Monocytes  0.7 


 


Absolute Eosinophils  0.0 


 


Absolute Basophils  0.1 


 


Sodium   138.4


 


Potassium   4.8


 


Chloride   103


 


Carbon Dioxide   22


 


Anion Gap   13


 


BUN   16


 


Creatinine   1.22


 


Est GFR ( Amer)   > 60


 


Est GFR (Non-Af Amer)   59 L


 


Glucose   172 H


 


Calcium   9.1


 


Total Bilirubin   0.3


 


AST   27


 


ALT   21


 


Alkaline Phosphatase   57


 


Total Protein   6.4


 


Albumin   3.0 L








 





12/06/18 16:24   Blood   Blood Culture - Final


                            NO GROWTH IN 5 DAYS


12/06/18 17:22   Foot - Abscess   Gram Stain - Final


12/06/18 17:22   Foot - Abscess   Wound Culture - Final


                            Staphylococcus Aureus


                            Enterococcus Faecalis(Group D)


                            Group B Beta Streptococcus


                            No Anaerobic Organisms








Impressions: 


 





Toe X-Ray  12/06/18 12:05


IMPRESSION:  1. Interval reduction of the previously seen dislocation of the 

distal phalanx of the left great toe.


2. There is new erosive loss of mineralization of the distal phalanx, 

concerning for osteomyelitis.


3.  Extensive soft tissue swelling about the left great toe with subcutaneous 

emphysema, findings concerning for gas-forming infection.


 








Lower Extremity MRI  12/07/18 00:00


IMPRESSION:  Marrow edema in the distal phalanx right great toe worrisome for 

osteomyelitis


Medial right great toe cellulitis


Although there is no interphalangeal joint effusion at the great toe, there is 

some abnormal marrow signal in the proximal phalanx at the interphalangeal 

joint.  Early involvement with osteomyelitis may be present.


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


 














Assessment & Plan





- Diagnosis


(1) Toe osteomyelitis, left


Is this a current diagnosis for this admission?: Yes   


Plan: 


Now status post left great toe amputation revision.


Blood cultures negative at 5 days.





Surgery is primary; have arranged for wound VAC to the left toe.  Dr. Hollins 

anticipates the patient will be ready for or discharged home within the next 2 

days.


Infectious Disease was consulted; recommends discontinuing vancomycin as the 

patient is negative for MRSA.  She further recommends discontinuing Zosyn 

within the next 1-2 days depending upon evaluation of surrounding skin for 

evidence of cellulitis.  I did not visualize these wounds personally today as 

they had new surgical dressings in place from his procedures yesterday that had 

not yet been removed by the surgeon.


Discharge planning is consulted; will require home health nursing for wound 

care.








(2) Toe osteomyelitis, right


Is this a current diagnosis for this admission?: Yes   


Plan: 


Now status post right great toe amputation secondary to chronic diabetic wound.


Surgery is primary; wound care per their recommendations.


Cultures and antibiotics as above.








(3) SOFIA (acute kidney injury)


Is this a current diagnosis for this admission?: Yes   


Plan: 


Resolved.


Acute on chronic CKD with baseline creatinine of 1.26.  Today he is noted to 

have a creatinine of 1.22 with a BUN of 16.


Avoid nephrotoxic medications as able.


Encouraged p.o. fluids.








(4) Hyperlipidemia


Is this a current diagnosis for this admission?: Yes   


Plan: 


ASCVD: 41%.  High intensity statins. 


Diet and lifestyle modification.








(5) Diabetes


Is this a current diagnosis for this admission?: Yes   


Plan: 


A1c 12.1%.


Patient is placed on a consistent carb diet with Accu-Cheks before meals and at 

bedtime with Humalog for sliding scale coverage.


Humalog 14 units subcu before meals.


Lantus 32 units subcu nightly.


Will start gabapentin 100 mg three times daily for neuropathy.


Registered dietitian and diabetic educator consulted.








(6) Hypertension


Is this a current diagnosis for this admission?: Yes   


Plan: 


Blood pressures are much improved; continue Toprol  mg daily, lisinopril 

5 mg daily.


Cardiac diet.


Registered dietitian has been consulted; appreciate their assistance with 

patient education.








- Time


Time Spent with patient: 15-24 minutes


Medications reviewed and adjusted accordingly: Yes


Anticipated discharge: Home with Homehealth

## 2018-12-12 NOTE — PDOC PROGRESS REPORT
Subjective


Reason For Visit: 


OSTEOMYELITIS








Physical Exam


Vital Signs: 


 











Temp Pulse Resp BP Pulse Ox


 


 98.0 F   67   20   141/62 H  99 


 


 12/12/18 12:00  12/12/18 12:00  12/12/18 12:00  12/12/18 12:00  12/12/18 12:00








 Intake & Output











 12/11/18 12/12/18 12/13/18





 06:59 06:59 06:59


 


Intake Total 6166 3743 1203


 


Output Total 2660 2750 800


 


Balance 3506 993 403


 


Weight 111.6 kg 104.5 kg 














Results


Laboratory Results: 


 





 12/11/18 04:28 





 12/11/18 04:28 





 





12/06/18 16:24   Blood   Blood Culture - Final


                            NO GROWTH IN 5 DAYS








Impressions: 


 





Toe X-Ray  12/06/18 12:05


IMPRESSION:  1. Interval reduction of the previously seen dislocation of the 

distal phalanx of the left great toe.


2. There is new erosive loss of mineralization of the distal phalanx, 

concerning for osteomyelitis.


3.  Extensive soft tissue swelling about the left great toe with subcutaneous 

emphysema, findings concerning for gas-forming infection.


 








Lower Extremity MRI  12/07/18 00:00


IMPRESSION:  Marrow edema in the distal phalanx right great toe worrisome for 

osteomyelitis


Medial right great toe cellulitis


Although there is no interphalangeal joint effusion at the great toe, there is 

some abnormal marrow signal in the proximal phalanx at the interphalangeal 

joint.  Early involvement with osteomyelitis may be present.


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


 














Assessment & Plan





- Plan Summary


Plan Summary: 





Status post amputation of toes.  Wound VAC is in place.  Patient is healing 

well.  Awaiting home wound VAC approval.  Discharge once home VAC approved and 

delivered.  Continue with local wound care.

## 2018-12-12 NOTE — PROGRESS NOTE E
Progress Note



NAME: MELI BALDWIN

MRN: [id number]

: 1950             AGE: 68Y

DATE:  2018          ROOM: 401



SUBJECTIVE:

The patient is currently sitting on the side of the bed.  He has been seen

by Surgery.  Dressings have been changed.  The patient denies any nausea,

vomiting, diarrhea.  No shortness of breath, dizziness, chest pain.  No

fevers or chills.  The patient has been afebrile, blood pressure has been

in a good range, and the patient does not voice any other concerns at this

time.



REVIEW OF SYSTEMS:

The rest of the review of systems is negative.



MEDICATIONS:

Medications have been reviewed.



OBJECTIVE:

GENERAL:  The patient is a 68-year-old  male who is awake, alert.

He is oriented to person, place, time, and situation.  He is verbal,

conversational, does not appear to be in acute distress.



VITAL SIGNS:  As follows:  Temperature is 98.2, pulse 72, respirations 18,

blood pressure 134/45, oxygen saturation is 94% on room air.



SKIN:  Warm and dry.  No rash, not diaphoretic.



HEENT:  Pupils equal, round, and reactive to light and accommodation. 

Conjunctivae pink.  There is no evidence of JVP.



CARDIOVASCULAR:  Heart is regular.  There is no murmur or rub.



CHEST:  Clear, symmetrical, unlabored.



ABDOMEN:  Soft, nontender, nondistended.



BACK:  No CVA tenderness or sacral edema.



EXTREMITIES:  No clubbing, cyanosis, edema.  The patient's bilateral lower

extremities are wrapped in appropriate dressing.



DIAGNOSTICS:

Lab values are as follows.  Hematology obtained on 2018:  WBCs are

2.5, hemoglobin is 9.8, hematocrit is 28.3, platelet count is 382,000.



Chemistry obtained on 2018:  Sodium is 138, potassium 4.8, chloride

103, carbon dioxide 22, BUN 16, creatinine 1.22, glucose 172, calcium 9.1,

bilirubin 0.3, AST 27, ALT 21, alk phos 57, total protein 6.4, albumin

3.0.



IMPRESSION AND PLAN:

1.  RIGHT AND LEFT GREAT TOE OSTEOMYELITIS.  The patient is postoperative

day #2 of amputation.  Do appreciate Surgery's input on this and

management.

2.  ACUTE KIDNEY INJURY, RESOLVED.

3.  HYPERLIPIDEMIA.  Continue statins.

4.  DIABETES MELLITUS TYPE 2.  The patient's A1c was 12.  He has been

placed on appropriate coverage, overall appears to have decent glycemic

control with appropriate coverage.

5.  HYPERTENSION.  Blood pressures have been in acceptable range.



DISPOSITION:

THE PATIENT IS A FULL CODE.  Pending the patient's symptomatology and

diagnostic findings, will re-evaluate in the a.m.



Time spent on this followup, including assessment/plan, physical

examination, patient education, review of records, is 25 minutes.



DICTATING PHYSICIAN:  CHRIS ROMEO NP





[id number]                  DT: 2018    1333

PHY#: [id number]            DD: 2018    1021

ID:   [id number]           JOB#: [id number]       ACCT: [id number]



cc:

>

## 2018-12-14 NOTE — DISCHARGE SUMMARY E
Discharge Summary



NAME: MELI BALDWIN

MRN:  [id number]        : 1950     AGE: 68Y

ADMITTED: 2018                 DISCHARGED: 2018



CODE STATUS:

FULL CODE.



PRIMARY CARE PROVIDER:

Junior Washington M.D.



OPERATING SURGEON:

Toy Hollins M.D.



DISCHARGE DIAGNOSES INCLUDE:

1.  Right and left great toe osteomyelitis, status post amputation,

postoperative day #3.

2.  Acute kidney injury, resolved.

3.  Hyperlipidemia.

4.  Diabetes mellitus type 2, uncontrolled.

5.  Hypertension.



DISCHARGE MEDICATIONS INCLUDE:

1.  Oxycodone 5 mg/325 mg 1 tablet p.o. every 6 hours p.r.n., 20 tablets

with 0 refills.

2.  Lisinopril 5 mg p.o. daily, 30 tablets with 0 refills.

3.  Humalog 14 units subcutaneously with each meal.

4.  Lantus 32 units subcutaneously daily.

5.  Neurontin 100 mg p.o. every 8 hours.

6.  Toprol  mg p.o. daily.

7.  Antivert 12.5 mg p.o. b.i.d. p.r.n.

8.  TriCor 145 mg p.o. daily.

9.  Multiple prescriptions for various diabetic testing supplies.



DIET:

Diabetic.



ACTIVITY:

Keep legs elevated but with Home Health and Physical Therapy.



CONDITION:

Fair.



DIAGNOSTICS:

Lab values are as follows.  Hematology obtained on 2018:  WBCs are

2.8, hemoglobin 9.8, hematocrit is 28.3, platelet count is 383,000.



Chemistry obtained on 2018:  Sodium is 138, potassium 4.8, chloride

103, carbon dioxide 22, BUN 16, creatinine 1.22, glucose 172, calcium is

9.1, magnesium is 1.7, bilirubin 0.3, AST 27, ALT is 21, alk phos 57,

total protein 6.4, albumin is 3.0.



MRI of the lower extremity obtained on 2018 reveals findings

consistent with osteomyelitis of the medial right great toe.



PHYSICAL EXAMINATION:

GENERAL:  The patient is a well-developed, well-nourished 68-year-old

 male who is awake, alert, and oriented to person, place, time,

and situation.  He is verbal, conversational, does not appear to be in

acute distress.



VITAL SIGNS:  As follows:  Temperature is 98.3, pulse 67, respirations 20,

blood pressure 127/87, oxygen saturation 98% on room air.



SKIN:  Warm and dry.  No rash, not diaphoretic.



HEENT:  Pupils equal, round, and reactive to light and accommodation. 

Conjunctivae is pink.  No evidence of JVP.



CARDIOVASCULAR:  Heart is regular.  There is no murmur or rub.



CHEST:  Clear, symmetrical, unlabored.



ABDOMEN:  Soft, nontender, nondistended.



BACK:  No CVA tenderness or sacral edema.



EXTREMITIES:  No clubbing, cyanosis, or edema.



PSYCHIATRIC:  Appropriate affect.  Pleasant mood.







HISTORY OF PRESENT ILLNESS:

The patient is a 68-year-old  male with a past medical history of

poorly controlled diabetes.  The patient presented to the emergency

department with a chief complaint of foot pain.  The patient presented

with plantar aspect ulcers of his bilateral great toes with weeping

discharge associated with pain with ambulation.  The patient has had a

left great toe plantar ulcer for about 4 months which has been managed

outpatient by his podiatrist.  About a week prior to presentation the

patient noticed that his toe ulcer was worsening and started to become

very red and more painful.  The patient went back to his podiatrist and

was discharged with wound care.  The patient noticed that his ulcer was

not getting better and, therefore, he came to the emergency department for

evaluation.  The patient had no fever, but while in the emergency

department he was found to have a severely elevated C-reactive protein and

x-rays were consistent with a possible osteomyelitis.  The patient was

seen by the surgery service in the emergency department and asked the

hospitalist to admit to help manage the patient's diabetes.



The patient was admitted to the medical unit.  The patient was started on

basal dose of Lantus as well as prandial coverage.  The patient's A1c was

severely elevated at 12.  The patient was seen and evaluated by the

surgery service and underwent operative amputation on 2018 as well

as 2018.  The patient's blood cultures remained negative.  The

patient has made progress.  The patient does have a wound VAC applied to

one of his wounds that is being managed by Surgery.  The patient is not

recommended to continue with any sort of antibiotic postoperatively, and

the patient is quite eager for discharge.



DISCHARGE PLANNIN.  The patient will follow up with his primary care provider within 2 to

4 weeks prior to the followup.

2.  The patient is advised to follow up with Surgery within 7 to 10 days

for hospital followup.

3.  The patient will receive the services of Home Health and Physical

Therapy as well as Wound Management.



Time spent on this discharge, including assessment/plan, physical

examination, patient education, review of records, and family meeting, is

35 minutes.



DICTATING PHYSICIAN:  CHRIS ROMEO NP





[id number]                  DT: 2018    1120

PHY#: [id number]            DD: 2018    1241

ID:   [id number]           JOB#: [id number]       ACCT: [id number]



cc:MD CHRIS Leach NP

>

## 2019-01-08 NOTE — RADIOLOGY REPORT (SQ)
EXAM DESCRIPTION:  FOOT RIGHT COMPLETE



COMPLETED DATE/TIME:  1/8/2019 10:53 am



REASON FOR STUDY:  NON-PRS CHRONIC ULCER OTH PRT RIGHT FOOT W FAT LAYER EXPOSED L97.512  NON-PRS 
ARABELLA ULCER OTH PRT RIGHT FOOT W FAT LAYER E11.621  TYPE 2 DIABETES MELLITUS WITH FOOT ULCER



COMPARISON:  12/6/2018.



NUMBER OF VIEWS:  Three views.



TECHNIQUE:  AP, lateral and oblique  radiographic images acquired of the right foot.



LIMITATIONS:  None.



FINDINGS:  MINERALIZATION: Normal.

BONES: Interval removal of the 1st digit.  Resection of the head of the 1st metatarsal.  No acute fra
cture or dislocation.  No worrisome bone lesions.

JOINTS: No effusions.

SOFT TISSUES: No soft tissue swelling.  No foreign body.

OTHER: No other significant finding.



IMPRESSION:  POSTOPERATIVE CHANGE WITH REMOVAL OF THE 1ST DIGIT AND RESECTION OF THE HEAD OF THE 1ST 
METATARSAL.  NO OTHER SIGNIFICANT RADIOGRAPHIC FINDING.



TECHNICAL DOCUMENTATION:  JOB ID:  [id number]

 2011 [employer]- All Rights Reserved



Reading location - IP/workstation name: [facility]

## 2019-01-15 NOTE — RADIOLOGY REPORT (SQ)
EXAM DESCRIPTION:  CHEST PA/LATERAL



COMPLETED DATE/TIME:  1/15/2019 10:05 am



REASON FOR STUDY:  PNEUMOTHORAX, UNSPECIFIED



COMPARISON:  None.



EXAM PARAMETERS:  NUMBER OF VIEWS: two views

TECHNIQUE: Digital Frontal and Lateral radiographic views of the chest acquired.

RADIATION DOSE: NA

LIMITATIONS: none



FINDINGS:  LUNGS AND PLEURA: No opacities, masses or pneumothorax. No pleural effusion.

MEDIASTINUM AND HILAR STRUCTURES: No masses or contour abnormalities.

HEART AND VASCULAR STRUCTURES: Heart normal size.  No evidence for failure.

BONES: No acute findings.

HARDWARE: None in the chest.

OTHER: No other significant finding.



IMPRESSION:  NO SIGNIFICANT RADIOGRAPHIC FINDING IN THE CHEST.



TECHNICAL DOCUMENTATION:  JOB ID:  [id number]

 2011 [employer]- All Rights Reserved



Reading location - IP/workstation name: [facility]

## 2019-01-15 NOTE — RADIOLOGY REPORT (SQ)
EXAM DESCRIPTION:  FOOT LEFT COMPLETE



COMPLETED DATE/TIME:  1/15/2019 10:05 am



REASON FOR STUDY:  NON-PRS CHRONIC ULCER OTH PRT LEFT FOOT W NECROSIS OF BONE J93.9  PNEUMOTHORAX, UN
SPECIFIED L97.524  NON-PRS CHRONIC ULCER OTH PRT LEFT FOOT W NECROSIS O



COMPARISON:  12/6/2018



NUMBER OF VIEWS:  Three views.



TECHNIQUE:  AP, lateral and oblique  radiographic images acquired of the left foot.



LIMITATIONS:  None.



FINDINGS:  There has been amputation of the base of the 1st metatarsal.  There is adjacent irregular 
periosteal reaction which could be osteomyelitis or myositis ossificans.  Skin ulcer plantar surface.
  No foreign body identified.



IMPRESSION:  Possible osteomyelitis 1st metatarsal stump.



TECHNICAL DOCUMENTATION:  JOB ID:  [id number]

 2011 [employer]- All Rights Reserved



Reading location - IP/workstation name: [facility]

## 2020-06-10 ENCOUNTER — HOSPITAL ENCOUNTER (OUTPATIENT)
Dept: HOSPITAL 62 - OROUT | Age: 70
Discharge: HOME | End: 2020-06-10
Attending: PODIATRIST
Payer: MEDICARE

## 2020-06-10 VITALS — SYSTOLIC BLOOD PRESSURE: 143 MMHG | DIASTOLIC BLOOD PRESSURE: 61 MMHG

## 2020-06-10 DIAGNOSIS — Z79.84: ICD-10-CM

## 2020-06-10 DIAGNOSIS — Z03.818: ICD-10-CM

## 2020-06-10 DIAGNOSIS — Z79.899: ICD-10-CM

## 2020-06-10 DIAGNOSIS — I48.91: ICD-10-CM

## 2020-06-10 DIAGNOSIS — M86.172: ICD-10-CM

## 2020-06-10 DIAGNOSIS — I10: ICD-10-CM

## 2020-06-10 DIAGNOSIS — Z79.4: ICD-10-CM

## 2020-06-10 DIAGNOSIS — E11.621: Primary | ICD-10-CM

## 2020-06-10 DIAGNOSIS — R00.0: ICD-10-CM

## 2020-06-10 PROCEDURE — 87635 SARS-COV-2 COVID-19 AMP PRB: CPT

## 2020-06-10 PROCEDURE — 82962 GLUCOSE BLOOD TEST: CPT

## 2020-06-10 PROCEDURE — C9803 HOPD COVID-19 SPEC COLLECT: HCPCS

## 2020-06-10 PROCEDURE — 36415 COLL VENOUS BLD VENIPUNCTURE: CPT

## 2020-06-10 PROCEDURE — 28825 PARTIAL AMPUTATION OF TOE: CPT

## 2020-06-10 PROCEDURE — 87205 SMEAR GRAM STAIN: CPT

## 2020-06-10 PROCEDURE — 87070 CULTURE OTHR SPECIMN AEROBIC: CPT

## 2020-06-10 PROCEDURE — 87075 CULTR BACTERIA EXCEPT BLOOD: CPT

## 2020-06-10 NOTE — OPERATIVE REPORT
Operative Report


DATE OF SURGERY: 06/10/20


PREOPERATIVE DIAGNOSIS: Osteomyelitis second toe left foot.


POSTOPERATIVE DIAGNOSIS: Name.


OPERATION: Amputation of the second toe left foot.


SURGEON: ROMINA REICH


ANESTHESIA: LMAC


COMPLICATIONS: 


None.


ESTIMATED BLOOD LOSS: Less than 1 mL.


PROCEDURE: 


Amputation second toe left foot.  Following intravenous sedation and injection 

of local anesthesia into the second toe, the patient's left foot and leg were 

prepped and draped in a sterile manner.  Stockinette was applied.  Attention was

then directed to the second toe where an open diabetic ulcer was identified 

plantar to the proximal interphalangeal joint.  An incision was placed just 

proximal to the plantar wound and extended circumferentially around the toe, 

none utilizing a bone cutter the middle of the proximal phalanx was osteotomized

and the distal portion of the toe including the diabetic ulcer were removed from

the wound in toto.  At that time a clean clean instrumentation was obtained, the

remaining stump of the proximal phalanx was further debrided with a rongeur, 

bone samples were obtained for bacterial culture.  Ulcer biopsies edges were 

then rasped smooth with a hand-held rasp.  The wound flushed with copious 

amounts of antibiotic solution and inspected for soft tissue or osseous debris 

with none being noted.  The wound was closed with interrupted horizontal 

mattress suture of 5-0 nylon.  Sterile dressing consisting of Acticoat Flex, 4 x

4's, fluff, conform, Kerlix, Coban.  Patient tolerated surgery anesthesia well a

nd was taken to the recovery room.